# Patient Record
Sex: MALE | Race: WHITE | NOT HISPANIC OR LATINO | URBAN - METROPOLITAN AREA
[De-identification: names, ages, dates, MRNs, and addresses within clinical notes are randomized per-mention and may not be internally consistent; named-entity substitution may affect disease eponyms.]

---

## 2020-01-01 ENCOUNTER — INPATIENT (INPATIENT)
Facility: HOSPITAL | Age: 75
LOS: 7 days | DRG: 871 | End: 2020-12-10
Attending: INTERNAL MEDICINE | Admitting: HOSPITALIST
Payer: MEDICARE

## 2020-01-01 VITALS
WEIGHT: 199.96 LBS | HEIGHT: 67 IN | TEMPERATURE: 100 F | OXYGEN SATURATION: 86 % | RESPIRATION RATE: 28 BRPM | HEART RATE: 110 BPM | SYSTOLIC BLOOD PRESSURE: 103 MMHG | DIASTOLIC BLOOD PRESSURE: 61 MMHG

## 2020-01-01 DIAGNOSIS — Z98.1 ARTHRODESIS STATUS: Chronic | ICD-10-CM

## 2020-01-01 DIAGNOSIS — Z98.890 OTHER SPECIFIED POSTPROCEDURAL STATES: Chronic | ICD-10-CM

## 2020-01-01 DIAGNOSIS — J96.90 RESPIRATORY FAILURE, UNSPECIFIED, UNSPECIFIED WHETHER WITH HYPOXIA OR HYPERCAPNIA: ICD-10-CM

## 2020-01-01 LAB
A1C WITH ESTIMATED AVERAGE GLUCOSE RESULT: 6.3 % — HIGH (ref 4–5.6)
ABO RH CONFIRMATION: SIGNIFICANT CHANGE UP
ALBUMIN SERPL ELPH-MCNC: 2.1 G/DL — LOW (ref 3.3–5.2)
ALBUMIN SERPL ELPH-MCNC: 2.6 G/DL — LOW (ref 3.3–5.2)
ALBUMIN SERPL ELPH-MCNC: 2.7 G/DL — LOW (ref 3.3–5.2)
ALBUMIN SERPL ELPH-MCNC: 2.7 G/DL — LOW (ref 3.3–5.2)
ALBUMIN SERPL ELPH-MCNC: 2.8 G/DL — LOW (ref 3.3–5.2)
ALBUMIN SERPL ELPH-MCNC: 2.8 G/DL — LOW (ref 3.3–5.2)
ALBUMIN SERPL ELPH-MCNC: 3 G/DL — LOW (ref 3.3–5.2)
ALBUMIN SERPL ELPH-MCNC: 3.1 G/DL — LOW (ref 3.3–5.2)
ALBUMIN SERPL ELPH-MCNC: 3.2 G/DL — LOW (ref 3.3–5.2)
ALBUMIN SERPL ELPH-MCNC: 3.2 G/DL — LOW (ref 3.3–5.2)
ALP SERPL-CCNC: 112 U/L — SIGNIFICANT CHANGE UP (ref 40–120)
ALP SERPL-CCNC: 141 U/L — HIGH (ref 40–120)
ALP SERPL-CCNC: 163 U/L — HIGH (ref 40–120)
ALP SERPL-CCNC: 166 U/L — HIGH (ref 40–120)
ALP SERPL-CCNC: 175 U/L — HIGH (ref 40–120)
ALP SERPL-CCNC: 179 U/L — HIGH (ref 40–120)
ALP SERPL-CCNC: 208 U/L — HIGH (ref 40–120)
ALP SERPL-CCNC: 214 U/L — HIGH (ref 40–120)
ALP SERPL-CCNC: 237 U/L — HIGH (ref 40–120)
ALP SERPL-CCNC: 246 U/L — HIGH (ref 40–120)
ALT FLD-CCNC: 142 U/L — HIGH
ALT FLD-CCNC: 213 U/L — HIGH
ALT FLD-CCNC: 235 U/L — HIGH
ALT FLD-CCNC: 239 U/L — HIGH
ALT FLD-CCNC: 271 U/L — HIGH
ALT FLD-CCNC: 285 U/L — HIGH
ALT FLD-CCNC: 286 U/L — HIGH
ALT FLD-CCNC: 288 U/L — HIGH
ALT FLD-CCNC: 292 U/L — HIGH
ALT FLD-CCNC: 85 U/L — HIGH
ANION GAP SERPL CALC-SCNC: 10 MMOL/L — SIGNIFICANT CHANGE UP (ref 5–17)
ANION GAP SERPL CALC-SCNC: 10 MMOL/L — SIGNIFICANT CHANGE UP (ref 5–17)
ANION GAP SERPL CALC-SCNC: 14 MMOL/L — SIGNIFICANT CHANGE UP (ref 5–17)
ANION GAP SERPL CALC-SCNC: 14 MMOL/L — SIGNIFICANT CHANGE UP (ref 5–17)
ANION GAP SERPL CALC-SCNC: 15 MMOL/L — SIGNIFICANT CHANGE UP (ref 5–17)
ANION GAP SERPL CALC-SCNC: 16 MMOL/L — SIGNIFICANT CHANGE UP (ref 5–17)
ANION GAP SERPL CALC-SCNC: 16 MMOL/L — SIGNIFICANT CHANGE UP (ref 5–17)
ANION GAP SERPL CALC-SCNC: 17 MMOL/L — SIGNIFICANT CHANGE UP (ref 5–17)
ANION GAP SERPL CALC-SCNC: 17 MMOL/L — SIGNIFICANT CHANGE UP (ref 5–17)
ANION GAP SERPL CALC-SCNC: 19 MMOL/L — HIGH (ref 5–17)
ANION GAP SERPL CALC-SCNC: 20 MMOL/L — HIGH (ref 5–17)
APPEARANCE UR: ABNORMAL
APPEARANCE UR: ABNORMAL
APTT BLD: 26.4 SEC — LOW (ref 27.5–35.5)
APTT BLD: 28.9 SEC — SIGNIFICANT CHANGE UP (ref 27.5–35.5)
APTT BLD: 33.4 SEC — SIGNIFICANT CHANGE UP (ref 27.5–35.5)
APTT BLD: 55.5 SEC — HIGH (ref 27.5–35.5)
AST SERPL-CCNC: 161 U/L — HIGH
AST SERPL-CCNC: 203 U/L — HIGH
AST SERPL-CCNC: 269 U/L — HIGH
AST SERPL-CCNC: 278 U/L — HIGH
AST SERPL-CCNC: 279 U/L — HIGH
AST SERPL-CCNC: 305 U/L — HIGH
AST SERPL-CCNC: 31 U/L — SIGNIFICANT CHANGE UP
AST SERPL-CCNC: 35 U/L — SIGNIFICANT CHANGE UP
AST SERPL-CCNC: 362 U/L — HIGH
AST SERPL-CCNC: 44 U/L — HIGH
AT III ACT/NOR PPP CHRO: 101 % — SIGNIFICANT CHANGE UP (ref 85–135)
B2 GLYCOPROT1 AB SER QL: POSITIVE
B2 GLYCOPROT1 IGA SER QL: 5.6 SAU — SIGNIFICANT CHANGE UP
B2 GLYCOPROT1 IGG SER-ACNC: <5 SGU — SIGNIFICANT CHANGE UP
B2 GLYCOPROT1 IGM SER-ACNC: >150 SMU — HIGH
BACTERIA # UR AUTO: ABNORMAL
BACTERIA # UR AUTO: ABNORMAL
BASE EXCESS BLDA CALC-SCNC: 4.8 MMOL/L — HIGH (ref -3–3)
BASE EXCESS BLDA CALC-SCNC: 5.6 MMOL/L — HIGH (ref -3–3)
BASE EXCESS BLDA CALC-SCNC: 5.9 MMOL/L — HIGH (ref -3–3)
BASOPHILS # BLD AUTO: 0.03 K/UL — SIGNIFICANT CHANGE UP (ref 0–0.2)
BASOPHILS # BLD AUTO: 0.03 K/UL — SIGNIFICANT CHANGE UP (ref 0–0.2)
BASOPHILS # BLD AUTO: 0.04 K/UL — SIGNIFICANT CHANGE UP (ref 0–0.2)
BASOPHILS # BLD AUTO: 0.06 K/UL — SIGNIFICANT CHANGE UP (ref 0–0.2)
BASOPHILS # BLD AUTO: 0.06 K/UL — SIGNIFICANT CHANGE UP (ref 0–0.2)
BASOPHILS NFR BLD AUTO: 0.1 % — SIGNIFICANT CHANGE UP (ref 0–2)
BASOPHILS NFR BLD AUTO: 0.2 % — SIGNIFICANT CHANGE UP (ref 0–2)
BASOPHILS NFR BLD AUTO: 0.2 % — SIGNIFICANT CHANGE UP (ref 0–2)
BASOPHILS NFR BLD AUTO: 0.3 % — SIGNIFICANT CHANGE UP (ref 0–2)
BASOPHILS NFR BLD AUTO: 0.4 % — SIGNIFICANT CHANGE UP (ref 0–2)
BILIRUB SERPL-MCNC: 0.4 MG/DL — SIGNIFICANT CHANGE UP (ref 0.4–2)
BILIRUB SERPL-MCNC: 0.5 MG/DL — SIGNIFICANT CHANGE UP (ref 0.4–2)
BILIRUB SERPL-MCNC: 0.5 MG/DL — SIGNIFICANT CHANGE UP (ref 0.4–2)
BILIRUB SERPL-MCNC: 0.6 MG/DL — SIGNIFICANT CHANGE UP (ref 0.4–2)
BILIRUB SERPL-MCNC: 0.7 MG/DL — SIGNIFICANT CHANGE UP (ref 0.4–2)
BILIRUB SERPL-MCNC: 0.8 MG/DL — SIGNIFICANT CHANGE UP (ref 0.4–2)
BILIRUB SERPL-MCNC: 0.9 MG/DL — SIGNIFICANT CHANGE UP (ref 0.4–2)
BILIRUB SERPL-MCNC: 1.2 MG/DL — SIGNIFICANT CHANGE UP (ref 0.4–2)
BILIRUB UR-MCNC: NEGATIVE — SIGNIFICANT CHANGE UP
BILIRUB UR-MCNC: NEGATIVE — SIGNIFICANT CHANGE UP
BLD GP AB SCN SERPL QL: SIGNIFICANT CHANGE UP
BLOOD GAS COMMENTS ARTERIAL: SIGNIFICANT CHANGE UP
BUN SERPL-MCNC: 100 MG/DL — HIGH (ref 8–20)
BUN SERPL-MCNC: 155 MG/DL — HIGH (ref 8–20)
BUN SERPL-MCNC: 43 MG/DL — HIGH (ref 8–20)
BUN SERPL-MCNC: 45 MG/DL — HIGH (ref 8–20)
BUN SERPL-MCNC: 49 MG/DL — HIGH (ref 8–20)
BUN SERPL-MCNC: 49 MG/DL — HIGH (ref 8–20)
BUN SERPL-MCNC: 50 MG/DL — HIGH (ref 8–20)
BUN SERPL-MCNC: 55 MG/DL — HIGH (ref 8–20)
BUN SERPL-MCNC: 61 MG/DL — HIGH (ref 8–20)
BUN SERPL-MCNC: 78 MG/DL — HIGH (ref 8–20)
BUN SERPL-MCNC: 91 MG/DL — HIGH (ref 8–20)
CALCIUM SERPL-MCNC: 8.2 MG/DL — LOW (ref 8.6–10.2)
CALCIUM SERPL-MCNC: 8.3 MG/DL — LOW (ref 8.6–10.2)
CALCIUM SERPL-MCNC: 8.4 MG/DL — LOW (ref 8.6–10.2)
CALCIUM SERPL-MCNC: 8.7 MG/DL — SIGNIFICANT CHANGE UP (ref 8.6–10.2)
CALCIUM SERPL-MCNC: 8.8 MG/DL — SIGNIFICANT CHANGE UP (ref 8.6–10.2)
CALCIUM SERPL-MCNC: 8.9 MG/DL — SIGNIFICANT CHANGE UP (ref 8.6–10.2)
CALCIUM SERPL-MCNC: 9 MG/DL — SIGNIFICANT CHANGE UP (ref 8.6–10.2)
CALCIUM SERPL-MCNC: 9.1 MG/DL — SIGNIFICANT CHANGE UP (ref 8.6–10.2)
CALCIUM SERPL-MCNC: 9.1 MG/DL — SIGNIFICANT CHANGE UP (ref 8.6–10.2)
CARDIOLIPIN AB SER-ACNC: POSITIVE
CARDIOLIPIN IGM SER-MCNC: 13.2 GPL — HIGH (ref 0–12.5)
CARDIOLIPIN IGM SER-MCNC: 20.4 MPL — HIGH (ref 0–12.5)
CHLORIDE SERPL-SCNC: 101 MMOL/L — SIGNIFICANT CHANGE UP (ref 98–107)
CHLORIDE SERPL-SCNC: 103 MMOL/L — SIGNIFICANT CHANGE UP (ref 98–107)
CHLORIDE SERPL-SCNC: 103 MMOL/L — SIGNIFICANT CHANGE UP (ref 98–107)
CHLORIDE SERPL-SCNC: 104 MMOL/L — SIGNIFICANT CHANGE UP (ref 98–107)
CHLORIDE SERPL-SCNC: 105 MMOL/L — SIGNIFICANT CHANGE UP (ref 98–107)
CHLORIDE SERPL-SCNC: 106 MMOL/L — SIGNIFICANT CHANGE UP (ref 98–107)
CHLORIDE SERPL-SCNC: 111 MMOL/L — HIGH (ref 98–107)
CHLORIDE SERPL-SCNC: 112 MMOL/L — HIGH (ref 98–107)
CHLORIDE SERPL-SCNC: 114 MMOL/L — HIGH (ref 98–107)
CHLORIDE SERPL-SCNC: 121 MMOL/L — HIGH (ref 98–107)
CHLORIDE SERPL-SCNC: 126 MMOL/L — HIGH (ref 98–107)
CK MB CFR SERPL CALC: 1.7 NG/ML — SIGNIFICANT CHANGE UP (ref 0–6.7)
CK MB CFR SERPL CALC: 4.9 NG/ML — SIGNIFICANT CHANGE UP (ref 0–6.7)
CK SERPL-CCNC: 1534 U/L — HIGH (ref 30–200)
CK SERPL-CCNC: 2775 U/L — HIGH (ref 30–200)
CO2 SERPL-SCNC: 20 MMOL/L — LOW (ref 22–29)
CO2 SERPL-SCNC: 20 MMOL/L — LOW (ref 22–29)
CO2 SERPL-SCNC: 21 MMOL/L — LOW (ref 22–29)
CO2 SERPL-SCNC: 22 MMOL/L — SIGNIFICANT CHANGE UP (ref 22–29)
CO2 SERPL-SCNC: 23 MMOL/L — SIGNIFICANT CHANGE UP (ref 22–29)
CO2 SERPL-SCNC: 23 MMOL/L — SIGNIFICANT CHANGE UP (ref 22–29)
CO2 SERPL-SCNC: 24 MMOL/L — SIGNIFICANT CHANGE UP (ref 22–29)
CO2 SERPL-SCNC: 25 MMOL/L — SIGNIFICANT CHANGE UP (ref 22–29)
CO2 SERPL-SCNC: 27 MMOL/L — SIGNIFICANT CHANGE UP (ref 22–29)
COLOR SPEC: YELLOW — SIGNIFICANT CHANGE UP
COLOR SPEC: YELLOW — SIGNIFICANT CHANGE UP
COMMENT - URINE: SIGNIFICANT CHANGE UP
COMMENT - URINE: SIGNIFICANT CHANGE UP
CREAT SERPL-MCNC: 1.25 MG/DL — SIGNIFICANT CHANGE UP (ref 0.5–1.3)
CREAT SERPL-MCNC: 1.5 MG/DL — HIGH (ref 0.5–1.3)
CREAT SERPL-MCNC: 1.54 MG/DL — HIGH (ref 0.5–1.3)
CREAT SERPL-MCNC: 1.65 MG/DL — HIGH (ref 0.5–1.3)
CREAT SERPL-MCNC: 1.83 MG/DL — HIGH (ref 0.5–1.3)
CREAT SERPL-MCNC: 1.97 MG/DL — HIGH (ref 0.5–1.3)
CREAT SERPL-MCNC: 1.99 MG/DL — HIGH (ref 0.5–1.3)
CREAT SERPL-MCNC: 2.27 MG/DL — HIGH (ref 0.5–1.3)
CREAT SERPL-MCNC: 2.74 MG/DL — HIGH (ref 0.5–1.3)
CREAT SERPL-MCNC: 2.75 MG/DL — HIGH (ref 0.5–1.3)
CREAT SERPL-MCNC: 3.38 MG/DL — HIGH (ref 0.5–1.3)
CRP SERPL-MCNC: 3.23 MG/DL — HIGH (ref 0–0.4)
CRP SERPL-MCNC: 34.98 MG/DL — HIGH (ref 0–0.4)
CRP SERPL-MCNC: 41.26 MG/DL — HIGH (ref 0–0.4)
CRP SERPL-MCNC: 8.42 MG/DL — HIGH (ref 0–0.4)
CRP SERPL-MCNC: >42 MG/DL — HIGH (ref 0–0.4)
CULTURE RESULTS: SIGNIFICANT CHANGE UP
CULTURE RESULTS: SIGNIFICANT CHANGE UP
D DIMER BLD IA.RAPID-MCNC: 632 NG/ML DDU — HIGH
D DIMER BLD IA.RAPID-MCNC: 690 NG/ML DDU — HIGH
D DIMER BLD IA.RAPID-MCNC: 695 NG/ML DDU — HIGH
DEPRECATED CARDIOLIPIN IGA SER: 5.4 APL — SIGNIFICANT CHANGE UP (ref 0–12.5)
DIFF PNL FLD: ABNORMAL
DIFF PNL FLD: ABNORMAL
DRVVT SCREEN TO CONFIRM RATIO: ABNORMAL
EOSINOPHIL # BLD AUTO: 0 K/UL — SIGNIFICANT CHANGE UP (ref 0–0.5)
EOSINOPHIL # BLD AUTO: 0.02 K/UL — SIGNIFICANT CHANGE UP (ref 0–0.5)
EOSINOPHIL # BLD AUTO: 0.03 K/UL — SIGNIFICANT CHANGE UP (ref 0–0.5)
EOSINOPHIL # BLD AUTO: 0.07 K/UL — SIGNIFICANT CHANGE UP (ref 0–0.5)
EOSINOPHIL # BLD AUTO: 0.12 K/UL — SIGNIFICANT CHANGE UP (ref 0–0.5)
EOSINOPHIL NFR BLD AUTO: 0 % — SIGNIFICANT CHANGE UP (ref 0–6)
EOSINOPHIL NFR BLD AUTO: 0.1 % — SIGNIFICANT CHANGE UP (ref 0–6)
EOSINOPHIL NFR BLD AUTO: 0.2 % — SIGNIFICANT CHANGE UP (ref 0–6)
EOSINOPHIL NFR BLD AUTO: 0.3 % — SIGNIFICANT CHANGE UP (ref 0–6)
EOSINOPHIL NFR BLD AUTO: 0.8 % — SIGNIFICANT CHANGE UP (ref 0–6)
EPI CELLS # UR: SIGNIFICANT CHANGE UP
EPI CELLS # UR: SIGNIFICANT CHANGE UP
ESTIMATED AVERAGE GLUCOSE: 134 MG/DL — HIGH (ref 68–114)
FERRITIN SERPL-MCNC: 1135 NG/ML — HIGH (ref 30–400)
FERRITIN SERPL-MCNC: 1400 NG/ML — HIGH (ref 30–400)
FERRITIN SERPL-MCNC: 794 NG/ML — HIGH (ref 30–400)
FIBRINOGEN AG PPP IA-MCNC: 1240 MG/DL — HIGH
FIBRINOGEN PPP-MCNC: >1400 MG/DL — CRITICAL HIGH (ref 290–520)
GAS PNL BLDA: SIGNIFICANT CHANGE UP
GLUCOSE BLDC GLUCOMTR-MCNC: 129 MG/DL — HIGH (ref 70–99)
GLUCOSE BLDC GLUCOMTR-MCNC: 132 MG/DL — HIGH (ref 70–99)
GLUCOSE BLDC GLUCOMTR-MCNC: 136 MG/DL — HIGH (ref 70–99)
GLUCOSE BLDC GLUCOMTR-MCNC: 138 MG/DL — HIGH (ref 70–99)
GLUCOSE BLDC GLUCOMTR-MCNC: 148 MG/DL — HIGH (ref 70–99)
GLUCOSE BLDC GLUCOMTR-MCNC: 151 MG/DL — HIGH (ref 70–99)
GLUCOSE BLDC GLUCOMTR-MCNC: 155 MG/DL — HIGH (ref 70–99)
GLUCOSE BLDC GLUCOMTR-MCNC: 155 MG/DL — HIGH (ref 70–99)
GLUCOSE BLDC GLUCOMTR-MCNC: 156 MG/DL — HIGH (ref 70–99)
GLUCOSE BLDC GLUCOMTR-MCNC: 160 MG/DL — HIGH (ref 70–99)
GLUCOSE BLDC GLUCOMTR-MCNC: 168 MG/DL — HIGH (ref 70–99)
GLUCOSE BLDC GLUCOMTR-MCNC: 171 MG/DL — HIGH (ref 70–99)
GLUCOSE BLDC GLUCOMTR-MCNC: 173 MG/DL — HIGH (ref 70–99)
GLUCOSE BLDC GLUCOMTR-MCNC: 173 MG/DL — HIGH (ref 70–99)
GLUCOSE BLDC GLUCOMTR-MCNC: 174 MG/DL — HIGH (ref 70–99)
GLUCOSE BLDC GLUCOMTR-MCNC: 181 MG/DL — HIGH (ref 70–99)
GLUCOSE BLDC GLUCOMTR-MCNC: 182 MG/DL — HIGH (ref 70–99)
GLUCOSE BLDC GLUCOMTR-MCNC: 182 MG/DL — HIGH (ref 70–99)
GLUCOSE BLDC GLUCOMTR-MCNC: 183 MG/DL — HIGH (ref 70–99)
GLUCOSE BLDC GLUCOMTR-MCNC: 184 MG/DL — HIGH (ref 70–99)
GLUCOSE BLDC GLUCOMTR-MCNC: 186 MG/DL — HIGH (ref 70–99)
GLUCOSE BLDC GLUCOMTR-MCNC: 186 MG/DL — HIGH (ref 70–99)
GLUCOSE BLDC GLUCOMTR-MCNC: 187 MG/DL — HIGH (ref 70–99)
GLUCOSE BLDC GLUCOMTR-MCNC: 189 MG/DL — HIGH (ref 70–99)
GLUCOSE BLDC GLUCOMTR-MCNC: 189 MG/DL — HIGH (ref 70–99)
GLUCOSE BLDC GLUCOMTR-MCNC: 231 MG/DL — HIGH (ref 70–99)
GLUCOSE BLDC GLUCOMTR-MCNC: 233 MG/DL — HIGH (ref 70–99)
GLUCOSE BLDC GLUCOMTR-MCNC: 292 MG/DL — HIGH (ref 70–99)
GLUCOSE SERPL-MCNC: 143 MG/DL — HIGH (ref 70–99)
GLUCOSE SERPL-MCNC: 146 MG/DL — HIGH (ref 70–99)
GLUCOSE SERPL-MCNC: 166 MG/DL — HIGH (ref 70–99)
GLUCOSE SERPL-MCNC: 178 MG/DL — HIGH (ref 70–99)
GLUCOSE SERPL-MCNC: 189 MG/DL — HIGH (ref 70–99)
GLUCOSE SERPL-MCNC: 191 MG/DL — HIGH (ref 70–99)
GLUCOSE SERPL-MCNC: 192 MG/DL — HIGH (ref 70–99)
GLUCOSE SERPL-MCNC: 199 MG/DL — HIGH (ref 70–99)
GLUCOSE SERPL-MCNC: 203 MG/DL — HIGH (ref 70–99)
GLUCOSE SERPL-MCNC: 203 MG/DL — HIGH (ref 70–99)
GLUCOSE SERPL-MCNC: 227 MG/DL — HIGH (ref 70–99)
GLUCOSE UR QL: NEGATIVE MG/DL — SIGNIFICANT CHANGE UP
GLUCOSE UR QL: NEGATIVE MG/DL — SIGNIFICANT CHANGE UP
HCO3 BLDA-SCNC: 28 MMOL/L — HIGH (ref 20–26)
HCO3 BLDA-SCNC: 29 MMOL/L — HIGH (ref 20–26)
HCO3 BLDA-SCNC: 30 MMOL/L — HIGH (ref 20–26)
HCT VFR BLD CALC: 39.4 % — SIGNIFICANT CHANGE UP (ref 39–50)
HCT VFR BLD CALC: 40.2 % — SIGNIFICANT CHANGE UP (ref 39–50)
HCT VFR BLD CALC: 40.4 % — SIGNIFICANT CHANGE UP (ref 39–50)
HCT VFR BLD CALC: 40.7 % — SIGNIFICANT CHANGE UP (ref 39–50)
HCT VFR BLD CALC: 41.6 % — SIGNIFICANT CHANGE UP (ref 39–50)
HCT VFR BLD CALC: 42.7 % — SIGNIFICANT CHANGE UP (ref 39–50)
HCT VFR BLD CALC: 45.1 % — SIGNIFICANT CHANGE UP (ref 39–50)
HCT VFR BLD CALC: 45.5 % — SIGNIFICANT CHANGE UP (ref 39–50)
HCT VFR BLD CALC: 45.9 % — SIGNIFICANT CHANGE UP (ref 39–50)
HCT VFR BLD CALC: 46.8 % — SIGNIFICANT CHANGE UP (ref 39–50)
HCT VFR BLD CALC: 48.3 % — SIGNIFICANT CHANGE UP (ref 39–50)
HCV AB S/CO SERPL IA: 0.06 S/CO — SIGNIFICANT CHANGE UP (ref 0–0.99)
HCV AB SERPL-IMP: SIGNIFICANT CHANGE UP
HGB BLD-MCNC: 13.3 G/DL — SIGNIFICANT CHANGE UP (ref 13–17)
HGB BLD-MCNC: 13.5 G/DL — SIGNIFICANT CHANGE UP (ref 13–17)
HGB BLD-MCNC: 13.8 G/DL — SIGNIFICANT CHANGE UP (ref 13–17)
HGB BLD-MCNC: 13.8 G/DL — SIGNIFICANT CHANGE UP (ref 13–17)
HGB BLD-MCNC: 14.1 G/DL — SIGNIFICANT CHANGE UP (ref 13–17)
HGB BLD-MCNC: 14.3 G/DL — SIGNIFICANT CHANGE UP (ref 13–17)
HGB BLD-MCNC: 14.4 G/DL — SIGNIFICANT CHANGE UP (ref 13–17)
HGB BLD-MCNC: 14.8 G/DL — SIGNIFICANT CHANGE UP (ref 13–17)
HGB BLD-MCNC: 14.9 G/DL — SIGNIFICANT CHANGE UP (ref 13–17)
HGB BLD-MCNC: 14.9 G/DL — SIGNIFICANT CHANGE UP (ref 13–17)
HGB BLD-MCNC: 15.7 G/DL — SIGNIFICANT CHANGE UP (ref 13–17)
HOROWITZ INDEX BLDA+IHG-RTO: 0.75 — SIGNIFICANT CHANGE UP
HOROWITZ INDEX BLDA+IHG-RTO: 75 — SIGNIFICANT CHANGE UP
HOROWITZ INDEX BLDA+IHG-RTO: SIGNIFICANT CHANGE UP
IMM GRANULOCYTES NFR BLD AUTO: 0.4 % — SIGNIFICANT CHANGE UP (ref 0–1.5)
IMM GRANULOCYTES NFR BLD AUTO: 0.4 % — SIGNIFICANT CHANGE UP (ref 0–1.5)
IMM GRANULOCYTES NFR BLD AUTO: 0.9 % — SIGNIFICANT CHANGE UP (ref 0–1.5)
IMM GRANULOCYTES NFR BLD AUTO: 0.9 % — SIGNIFICANT CHANGE UP (ref 0–1.5)
IMM GRANULOCYTES NFR BLD AUTO: 1.1 % — SIGNIFICANT CHANGE UP (ref 0–1.5)
INR BLD: 1.31 RATIO — HIGH (ref 0.88–1.16)
INR BLD: 1.37 RATIO — HIGH (ref 0.88–1.16)
INR BLD: 1.6 RATIO — HIGH (ref 0.88–1.16)
INR BLD: 1.78 RATIO — HIGH (ref 0.88–1.16)
KETONES UR-MCNC: NEGATIVE — SIGNIFICANT CHANGE UP
KETONES UR-MCNC: NEGATIVE — SIGNIFICANT CHANGE UP
LA NT DPL PPP QL: 74.7 SEC — SIGNIFICANT CHANGE UP
LACTATE SERPL-SCNC: 1.7 MMOL/L — SIGNIFICANT CHANGE UP (ref 0.5–2)
LACTATE SERPL-SCNC: 2 MMOL/L — SIGNIFICANT CHANGE UP (ref 0.5–2)
LACTATE SERPL-SCNC: 2.1 MMOL/L — HIGH (ref 0.5–2)
LACTATE SERPL-SCNC: 4.9 MMOL/L — CRITICAL HIGH (ref 0.5–2)
LDH SERPL L TO P-CCNC: 625 U/L — HIGH (ref 98–192)
LEUKOCYTE ESTERASE UR-ACNC: ABNORMAL
LEUKOCYTE ESTERASE UR-ACNC: NEGATIVE — SIGNIFICANT CHANGE UP
LYMPHOCYTES # BLD AUTO: 0.54 K/UL — LOW (ref 1–3.3)
LYMPHOCYTES # BLD AUTO: 0.8 K/UL — LOW (ref 1–3.3)
LYMPHOCYTES # BLD AUTO: 0.87 K/UL — LOW (ref 1–3.3)
LYMPHOCYTES # BLD AUTO: 0.89 K/UL — LOW (ref 1–3.3)
LYMPHOCYTES # BLD AUTO: 1.18 K/UL — SIGNIFICANT CHANGE UP (ref 1–3.3)
LYMPHOCYTES # BLD AUTO: 2.6 % — LOW (ref 13–44)
LYMPHOCYTES # BLD AUTO: 3.7 % — LOW (ref 13–44)
LYMPHOCYTES # BLD AUTO: 4 % — LOW (ref 13–44)
LYMPHOCYTES # BLD AUTO: 5.3 % — LOW (ref 13–44)
LYMPHOCYTES # BLD AUTO: 8.2 % — LOW (ref 13–44)
MAGNESIUM SERPL-MCNC: 2.6 MG/DL — SIGNIFICANT CHANGE UP (ref 1.6–2.6)
MAGNESIUM SERPL-MCNC: 2.8 MG/DL — HIGH (ref 1.6–2.6)
MAGNESIUM SERPL-MCNC: 2.8 MG/DL — HIGH (ref 1.6–2.6)
MAGNESIUM SERPL-MCNC: 2.9 MG/DL — HIGH (ref 1.6–2.6)
MAGNESIUM SERPL-MCNC: 3.4 MG/DL — HIGH (ref 1.6–2.6)
MAGNESIUM SERPL-MCNC: 3.4 MG/DL — HIGH (ref 1.6–2.6)
MAGNESIUM SERPL-MCNC: 3.6 MG/DL — HIGH (ref 1.6–2.6)
MCHC RBC-ENTMCNC: 27.8 PG — SIGNIFICANT CHANGE UP (ref 27–34)
MCHC RBC-ENTMCNC: 28.2 PG — SIGNIFICANT CHANGE UP (ref 27–34)
MCHC RBC-ENTMCNC: 28.2 PG — SIGNIFICANT CHANGE UP (ref 27–34)
MCHC RBC-ENTMCNC: 28.3 PG — SIGNIFICANT CHANGE UP (ref 27–34)
MCHC RBC-ENTMCNC: 28.3 PG — SIGNIFICANT CHANGE UP (ref 27–34)
MCHC RBC-ENTMCNC: 28.4 PG — SIGNIFICANT CHANGE UP (ref 27–34)
MCHC RBC-ENTMCNC: 28.5 PG — SIGNIFICANT CHANGE UP (ref 27–34)
MCHC RBC-ENTMCNC: 28.6 PG — SIGNIFICANT CHANGE UP (ref 27–34)
MCHC RBC-ENTMCNC: 28.9 PG — SIGNIFICANT CHANGE UP (ref 27–34)
MCHC RBC-ENTMCNC: 30.8 GM/DL — LOW (ref 32–36)
MCHC RBC-ENTMCNC: 32.5 GM/DL — SIGNIFICANT CHANGE UP (ref 32–36)
MCHC RBC-ENTMCNC: 32.5 GM/DL — SIGNIFICANT CHANGE UP (ref 32–36)
MCHC RBC-ENTMCNC: 32.7 GM/DL — SIGNIFICANT CHANGE UP (ref 32–36)
MCHC RBC-ENTMCNC: 32.8 GM/DL — SIGNIFICANT CHANGE UP (ref 32–36)
MCHC RBC-ENTMCNC: 33.2 GM/DL — SIGNIFICANT CHANGE UP (ref 32–36)
MCHC RBC-ENTMCNC: 33.5 GM/DL — SIGNIFICANT CHANGE UP (ref 32–36)
MCHC RBC-ENTMCNC: 33.8 GM/DL — SIGNIFICANT CHANGE UP (ref 32–36)
MCHC RBC-ENTMCNC: 33.9 GM/DL — SIGNIFICANT CHANGE UP (ref 32–36)
MCHC RBC-ENTMCNC: 34.2 GM/DL — SIGNIFICANT CHANGE UP (ref 32–36)
MCHC RBC-ENTMCNC: 34.3 GM/DL — SIGNIFICANT CHANGE UP (ref 32–36)
MCV RBC AUTO: 82.7 FL — SIGNIFICANT CHANGE UP (ref 80–100)
MCV RBC AUTO: 83.6 FL — SIGNIFICANT CHANGE UP (ref 80–100)
MCV RBC AUTO: 84 FL — SIGNIFICANT CHANGE UP (ref 80–100)
MCV RBC AUTO: 84.7 FL — SIGNIFICANT CHANGE UP (ref 80–100)
MCV RBC AUTO: 85.1 FL — SIGNIFICANT CHANGE UP (ref 80–100)
MCV RBC AUTO: 85.5 FL — SIGNIFICANT CHANGE UP (ref 80–100)
MCV RBC AUTO: 86.2 FL — SIGNIFICANT CHANGE UP (ref 80–100)
MCV RBC AUTO: 86.7 FL — SIGNIFICANT CHANGE UP (ref 80–100)
MCV RBC AUTO: 86.9 FL — SIGNIFICANT CHANGE UP (ref 80–100)
MCV RBC AUTO: 87 FL — SIGNIFICANT CHANGE UP (ref 80–100)
MCV RBC AUTO: 90.3 FL — SIGNIFICANT CHANGE UP (ref 80–100)
MONOCYTES # BLD AUTO: 0.42 K/UL — SIGNIFICANT CHANGE UP (ref 0–0.9)
MONOCYTES # BLD AUTO: 0.53 K/UL — SIGNIFICANT CHANGE UP (ref 0–0.9)
MONOCYTES # BLD AUTO: 0.7 K/UL — SIGNIFICANT CHANGE UP (ref 0–0.9)
MONOCYTES # BLD AUTO: 0.8 K/UL — SIGNIFICANT CHANGE UP (ref 0–0.9)
MONOCYTES # BLD AUTO: 0.87 K/UL — SIGNIFICANT CHANGE UP (ref 0–0.9)
MONOCYTES NFR BLD AUTO: 2.6 % — SIGNIFICANT CHANGE UP (ref 2–14)
MONOCYTES NFR BLD AUTO: 2.8 % — SIGNIFICANT CHANGE UP (ref 2–14)
MONOCYTES NFR BLD AUTO: 3.2 % — SIGNIFICANT CHANGE UP (ref 2–14)
MONOCYTES NFR BLD AUTO: 3.6 % — SIGNIFICANT CHANGE UP (ref 2–14)
MONOCYTES NFR BLD AUTO: 5.6 % — SIGNIFICANT CHANGE UP (ref 2–14)
NEUTROPHILS # BLD AUTO: 12.12 K/UL — HIGH (ref 1.8–7.4)
NEUTROPHILS # BLD AUTO: 13.64 K/UL — HIGH (ref 1.8–7.4)
NEUTROPHILS # BLD AUTO: 19.05 K/UL — HIGH (ref 1.8–7.4)
NEUTROPHILS # BLD AUTO: 19.73 K/UL — HIGH (ref 1.8–7.4)
NEUTROPHILS # BLD AUTO: 21.88 K/UL — HIGH (ref 1.8–7.4)
NEUTROPHILS NFR BLD AUTO: 84.6 % — HIGH (ref 43–77)
NEUTROPHILS NFR BLD AUTO: 91.1 % — HIGH (ref 43–77)
NEUTROPHILS NFR BLD AUTO: 91.4 % — HIGH (ref 43–77)
NEUTROPHILS NFR BLD AUTO: 91.5 % — HIGH (ref 43–77)
NEUTROPHILS NFR BLD AUTO: 93.5 % — HIGH (ref 43–77)
NITRITE UR-MCNC: NEGATIVE — SIGNIFICANT CHANGE UP
NITRITE UR-MCNC: NEGATIVE — SIGNIFICANT CHANGE UP
NORMALIZED SCT PPP-RTO: 0.81 RATIO — SIGNIFICANT CHANGE UP (ref 0–1.16)
NORMALIZED SCT PPP-RTO: SIGNIFICANT CHANGE UP
OSMOLALITY UR: 623 MOSM/KG — SIGNIFICANT CHANGE UP (ref 300–1000)
PCO2 BLDA: 35 MMHG — SIGNIFICANT CHANGE UP (ref 35–45)
PCO2 BLDA: 38 MMHG — SIGNIFICANT CHANGE UP (ref 35–45)
PCO2 BLDA: 40 MMHG — SIGNIFICANT CHANGE UP (ref 35–45)
PH BLDA: 7.48 — HIGH (ref 7.35–7.45)
PH BLDA: 7.49 — HIGH (ref 7.35–7.45)
PH BLDA: 7.51 — HIGH (ref 7.35–7.45)
PH UR: 5 — SIGNIFICANT CHANGE UP (ref 5–8)
PH UR: 5 — SIGNIFICANT CHANGE UP (ref 5–8)
PHOSPHATE SERPL-MCNC: 3.4 MG/DL — SIGNIFICANT CHANGE UP (ref 2.4–4.7)
PHOSPHATE SERPL-MCNC: 3.4 MG/DL — SIGNIFICANT CHANGE UP (ref 2.4–4.7)
PHOSPHATE SERPL-MCNC: 3.7 MG/DL — SIGNIFICANT CHANGE UP (ref 2.4–4.7)
PHOSPHATE SERPL-MCNC: 3.9 MG/DL — SIGNIFICANT CHANGE UP (ref 2.4–4.7)
PHOSPHATE SERPL-MCNC: 5.1 MG/DL — HIGH (ref 2.4–4.7)
PHOSPHATE SERPL-MCNC: 6 MG/DL — HIGH (ref 2.4–4.7)
PHOSPHATE SERPL-MCNC: 7.1 MG/DL — HIGH (ref 2.4–4.7)
PLATELET # BLD AUTO: 277 K/UL — SIGNIFICANT CHANGE UP (ref 150–400)
PLATELET # BLD AUTO: 289 K/UL — SIGNIFICANT CHANGE UP (ref 150–400)
PLATELET # BLD AUTO: 296 K/UL — SIGNIFICANT CHANGE UP (ref 150–400)
PLATELET # BLD AUTO: 350 K/UL — SIGNIFICANT CHANGE UP (ref 150–400)
PLATELET # BLD AUTO: 352 K/UL — SIGNIFICANT CHANGE UP (ref 150–400)
PLATELET # BLD AUTO: 357 K/UL — SIGNIFICANT CHANGE UP (ref 150–400)
PLATELET # BLD AUTO: 376 K/UL — SIGNIFICANT CHANGE UP (ref 150–400)
PLATELET # BLD AUTO: 409 K/UL — HIGH (ref 150–400)
PLATELET # BLD AUTO: 418 K/UL — HIGH (ref 150–400)
PLATELET # BLD AUTO: 425 K/UL — HIGH (ref 150–400)
PLATELET # BLD AUTO: 450 K/UL — HIGH (ref 150–400)
PO2 BLDA: 57 MMHG — LOW (ref 83–108)
PO2 BLDA: 75 MMHG — LOW (ref 83–108)
PO2 BLDA: 82 MMHG — LOW (ref 83–108)
POTASSIUM SERPL-MCNC: 3.6 MMOL/L — SIGNIFICANT CHANGE UP (ref 3.5–5.3)
POTASSIUM SERPL-MCNC: 3.8 MMOL/L — SIGNIFICANT CHANGE UP (ref 3.5–5.3)
POTASSIUM SERPL-MCNC: 4 MMOL/L — SIGNIFICANT CHANGE UP (ref 3.5–5.3)
POTASSIUM SERPL-MCNC: 4.1 MMOL/L — SIGNIFICANT CHANGE UP (ref 3.5–5.3)
POTASSIUM SERPL-MCNC: 4.2 MMOL/L — SIGNIFICANT CHANGE UP (ref 3.5–5.3)
POTASSIUM SERPL-MCNC: 4.3 MMOL/L — SIGNIFICANT CHANGE UP (ref 3.5–5.3)
POTASSIUM SERPL-MCNC: 4.6 MMOL/L — SIGNIFICANT CHANGE UP (ref 3.5–5.3)
POTASSIUM SERPL-MCNC: 5.7 MMOL/L — HIGH (ref 3.5–5.3)
POTASSIUM SERPL-SCNC: 3.6 MMOL/L — SIGNIFICANT CHANGE UP (ref 3.5–5.3)
POTASSIUM SERPL-SCNC: 3.8 MMOL/L — SIGNIFICANT CHANGE UP (ref 3.5–5.3)
POTASSIUM SERPL-SCNC: 4 MMOL/L — SIGNIFICANT CHANGE UP (ref 3.5–5.3)
POTASSIUM SERPL-SCNC: 4.1 MMOL/L — SIGNIFICANT CHANGE UP (ref 3.5–5.3)
POTASSIUM SERPL-SCNC: 4.2 MMOL/L — SIGNIFICANT CHANGE UP (ref 3.5–5.3)
POTASSIUM SERPL-SCNC: 4.3 MMOL/L — SIGNIFICANT CHANGE UP (ref 3.5–5.3)
POTASSIUM SERPL-SCNC: 4.6 MMOL/L — SIGNIFICANT CHANGE UP (ref 3.5–5.3)
POTASSIUM SERPL-SCNC: 5.7 MMOL/L — HIGH (ref 3.5–5.3)
PROCALCITONIN SERPL-MCNC: 0.3 NG/ML — HIGH (ref 0.02–0.1)
PROCALCITONIN SERPL-MCNC: 0.32 NG/ML — HIGH (ref 0.02–0.1)
PROCALCITONIN SERPL-MCNC: 0.78 NG/ML — HIGH (ref 0.02–0.1)
PROCALCITONIN SERPL-MCNC: 1 NG/ML — HIGH (ref 0.02–0.1)
PROCALCITONIN SERPL-MCNC: 1.3 NG/ML — HIGH (ref 0.02–0.1)
PROCALCITONIN SERPL-MCNC: 1.61 NG/ML — HIGH (ref 0.02–0.1)
PROT C ACT/NOR PPP: 104 % — SIGNIFICANT CHANGE UP (ref 74–150)
PROT C ACT/NOR PPP: 118 % — SIGNIFICANT CHANGE UP (ref 74–150)
PROT C ACT/NOR PPP: 98 % — SIGNIFICANT CHANGE UP (ref 74–150)
PROT S FREE AG PPP IA-ACNC: 55 % — LOW (ref 67–141)
PROT S FREE PPP-ACNC: 51 % — LOW (ref 63–140)
PROT S FREE PPP-ACNC: 73 % — SIGNIFICANT CHANGE UP (ref 63–140)
PROT SERPL-MCNC: 6.2 G/DL — LOW (ref 6.6–8.7)
PROT SERPL-MCNC: 6.5 G/DL — LOW (ref 6.6–8.7)
PROT SERPL-MCNC: 7 G/DL — SIGNIFICANT CHANGE UP (ref 6.6–8.7)
PROT SERPL-MCNC: 7 G/DL — SIGNIFICANT CHANGE UP (ref 6.6–8.7)
PROT SERPL-MCNC: 7.3 G/DL — SIGNIFICANT CHANGE UP (ref 6.6–8.7)
PROT SERPL-MCNC: 7.4 G/DL — SIGNIFICANT CHANGE UP (ref 6.6–8.7)
PROT SERPL-MCNC: 7.6 G/DL — SIGNIFICANT CHANGE UP (ref 6.6–8.7)
PROT SERPL-MCNC: 7.9 G/DL — SIGNIFICANT CHANGE UP (ref 6.6–8.7)
PROT UR-MCNC: 100 MG/DL
PROT UR-MCNC: 100 MG/DL
PROTHROM AB SERPL-ACNC: 15 SEC — HIGH (ref 10.6–13.6)
PROTHROM AB SERPL-ACNC: 15.7 SEC — HIGH (ref 10.6–13.6)
PROTHROM AB SERPL-ACNC: 18.1 SEC — HIGH (ref 10.6–13.6)
PROTHROM AB SERPL-ACNC: 20.1 SEC — HIGH (ref 10.6–13.6)
RAPID RVP RESULT: SIGNIFICANT CHANGE UP
RBC # BLD: 4.61 M/UL — SIGNIFICANT CHANGE UP (ref 4.2–5.8)
RBC # BLD: 4.78 M/UL — SIGNIFICANT CHANGE UP (ref 4.2–5.8)
RBC # BLD: 4.83 M/UL — SIGNIFICANT CHANGE UP (ref 4.2–5.8)
RBC # BLD: 4.86 M/UL — SIGNIFICANT CHANGE UP (ref 4.2–5.8)
RBC # BLD: 4.95 M/UL — SIGNIFICANT CHANGE UP (ref 4.2–5.8)
RBC # BLD: 5.04 M/UL — SIGNIFICANT CHANGE UP (ref 4.2–5.8)
RBC # BLD: 5.18 M/UL — SIGNIFICANT CHANGE UP (ref 4.2–5.8)
RBC # BLD: 5.23 M/UL — SIGNIFICANT CHANGE UP (ref 4.2–5.8)
RBC # BLD: 5.25 M/UL — SIGNIFICANT CHANGE UP (ref 4.2–5.8)
RBC # BLD: 5.28 M/UL — SIGNIFICANT CHANGE UP (ref 4.2–5.8)
RBC # BLD: 5.55 M/UL — SIGNIFICANT CHANGE UP (ref 4.2–5.8)
RBC # FLD: 14.5 % — SIGNIFICANT CHANGE UP (ref 10.3–14.5)
RBC # FLD: 14.6 % — HIGH (ref 10.3–14.5)
RBC # FLD: 15.1 % — HIGH (ref 10.3–14.5)
RBC # FLD: 15.5 % — HIGH (ref 10.3–14.5)
RBC # FLD: 15.7 % — HIGH (ref 10.3–14.5)
RBC # FLD: 15.7 % — HIGH (ref 10.3–14.5)
RBC # FLD: 15.8 % — HIGH (ref 10.3–14.5)
RBC # FLD: 15.9 % — HIGH (ref 10.3–14.5)
RBC CASTS # UR COMP ASSIST: ABNORMAL /HPF (ref 0–4)
RBC CASTS # UR COMP ASSIST: ABNORMAL /HPF (ref 0–4)
SAO2 % BLDA: 91 % — LOW (ref 95–99)
SAO2 % BLDA: 95 % — SIGNIFICANT CHANGE UP (ref 95–99)
SAO2 % BLDA: 96 % — SIGNIFICANT CHANGE UP (ref 95–99)
SARS-COV-2 IGG SERPL QL IA: NEGATIVE — SIGNIFICANT CHANGE UP
SARS-COV-2 IGM SERPL IA-ACNC: 0.66 INDEX — SIGNIFICANT CHANGE UP
SARS-COV-2 RNA SPEC QL NAA+PROBE: DETECTED
SODIUM SERPL-SCNC: 139 MMOL/L — SIGNIFICANT CHANGE UP (ref 135–145)
SODIUM SERPL-SCNC: 141 MMOL/L — SIGNIFICANT CHANGE UP (ref 135–145)
SODIUM SERPL-SCNC: 143 MMOL/L — SIGNIFICANT CHANGE UP (ref 135–145)
SODIUM SERPL-SCNC: 143 MMOL/L — SIGNIFICANT CHANGE UP (ref 135–145)
SODIUM SERPL-SCNC: 144 MMOL/L — SIGNIFICANT CHANGE UP (ref 135–145)
SODIUM SERPL-SCNC: 147 MMOL/L — HIGH (ref 135–145)
SODIUM SERPL-SCNC: 148 MMOL/L — HIGH (ref 135–145)
SODIUM SERPL-SCNC: 154 MMOL/L — HIGH (ref 135–145)
SODIUM SERPL-SCNC: 154 MMOL/L — HIGH (ref 135–145)
SODIUM SERPL-SCNC: 156 MMOL/L — HIGH (ref 135–145)
SODIUM SERPL-SCNC: 157 MMOL/L — HIGH (ref 135–145)
SODIUM UR-SCNC: 80 MMOL/L — SIGNIFICANT CHANGE UP
SP GR SPEC: 1.01 — SIGNIFICANT CHANGE UP (ref 1.01–1.02)
SP GR SPEC: 1.01 — SIGNIFICANT CHANGE UP (ref 1.01–1.02)
SPECIMEN SOURCE: SIGNIFICANT CHANGE UP
SPECIMEN SOURCE: SIGNIFICANT CHANGE UP
TROPONIN T SERPL-MCNC: 0.01 NG/ML — SIGNIFICANT CHANGE UP (ref 0–0.06)
TROPONIN T SERPL-MCNC: 0.09 NG/ML — HIGH (ref 0–0.06)
TROPONIN T SERPL-MCNC: 0.13 NG/ML — HIGH (ref 0–0.06)
URATE CRY FLD QL MICRO: ABNORMAL
UROBILINOGEN FLD QL: 4 MG/DL
UROBILINOGEN FLD QL: NEGATIVE MG/DL — SIGNIFICANT CHANGE UP
VANCOMYCIN TROUGH SERPL-MCNC: 11.2 UG/ML — SIGNIFICANT CHANGE UP (ref 10–20)
VANCOMYCIN TROUGH SERPL-MCNC: 22.8 UG/ML — HIGH (ref 10–20)
VANCOMYCIN TROUGH SERPL-MCNC: 9.8 UG/ML — LOW (ref 10–20)
WBC # BLD: 14.34 K/UL — HIGH (ref 3.8–10.5)
WBC # BLD: 14.98 K/UL — HIGH (ref 3.8–10.5)
WBC # BLD: 20.4 K/UL — HIGH (ref 3.8–10.5)
WBC # BLD: 21.13 K/UL — HIGH (ref 3.8–10.5)
WBC # BLD: 21.59 K/UL — HIGH (ref 3.8–10.5)
WBC # BLD: 23.91 K/UL — HIGH (ref 3.8–10.5)
WBC # BLD: 24.26 K/UL — HIGH (ref 3.8–10.5)
WBC # BLD: 26.42 K/UL — HIGH (ref 3.8–10.5)
WBC # BLD: 27.59 K/UL — HIGH (ref 3.8–10.5)
WBC # BLD: 29.58 K/UL — HIGH (ref 3.8–10.5)
WBC # BLD: 29.95 K/UL — HIGH (ref 3.8–10.5)
WBC # FLD AUTO: 14.34 K/UL — HIGH (ref 3.8–10.5)
WBC # FLD AUTO: 14.98 K/UL — HIGH (ref 3.8–10.5)
WBC # FLD AUTO: 20.4 K/UL — HIGH (ref 3.8–10.5)
WBC # FLD AUTO: 21.13 K/UL — HIGH (ref 3.8–10.5)
WBC # FLD AUTO: 21.59 K/UL — HIGH (ref 3.8–10.5)
WBC # FLD AUTO: 23.91 K/UL — HIGH (ref 3.8–10.5)
WBC # FLD AUTO: 24.26 K/UL — HIGH (ref 3.8–10.5)
WBC # FLD AUTO: 26.42 K/UL — HIGH (ref 3.8–10.5)
WBC # FLD AUTO: 27.59 K/UL — HIGH (ref 3.8–10.5)
WBC # FLD AUTO: 29.58 K/UL — HIGH (ref 3.8–10.5)
WBC # FLD AUTO: 29.95 K/UL — HIGH (ref 3.8–10.5)
WBC UR QL: SIGNIFICANT CHANGE UP
WBC UR QL: SIGNIFICANT CHANGE UP

## 2020-01-01 PROCEDURE — 99233 SBSQ HOSP IP/OBS HIGH 50: CPT

## 2020-01-01 PROCEDURE — 99233 SBSQ HOSP IP/OBS HIGH 50: CPT | Mod: CS,GC

## 2020-01-01 PROCEDURE — 99223 1ST HOSP IP/OBS HIGH 75: CPT | Mod: CS

## 2020-01-01 PROCEDURE — 99291 CRITICAL CARE FIRST HOUR: CPT | Mod: CS

## 2020-01-01 PROCEDURE — 76770 US EXAM ABDO BACK WALL COMP: CPT | Mod: 26

## 2020-01-01 PROCEDURE — 93970 EXTREMITY STUDY: CPT | Mod: 26

## 2020-01-01 PROCEDURE — 99233 SBSQ HOSP IP/OBS HIGH 50: CPT | Mod: CS

## 2020-01-01 PROCEDURE — 99497 ADVNCD CARE PLAN 30 MIN: CPT | Mod: CS,25

## 2020-01-01 PROCEDURE — 93010 ELECTROCARDIOGRAM REPORT: CPT

## 2020-01-01 PROCEDURE — 71045 X-RAY EXAM CHEST 1 VIEW: CPT | Mod: 26

## 2020-01-01 PROCEDURE — 99497 ADVNCD CARE PLAN 30 MIN: CPT | Mod: CS

## 2020-01-01 PROCEDURE — 73630 X-RAY EXAM OF FOOT: CPT | Mod: 26,RT

## 2020-01-01 PROCEDURE — 99232 SBSQ HOSP IP/OBS MODERATE 35: CPT | Mod: CS

## 2020-01-01 PROCEDURE — 99291 CRITICAL CARE FIRST HOUR: CPT

## 2020-01-01 PROCEDURE — 99222 1ST HOSP IP/OBS MODERATE 55: CPT | Mod: CS

## 2020-01-01 PROCEDURE — 99292 CRITICAL CARE ADDL 30 MIN: CPT | Mod: CS

## 2020-01-01 RX ORDER — ACETAMINOPHEN 500 MG
650 TABLET ORAL EVERY 4 HOURS
Refills: 0 | Status: DISCONTINUED | OUTPATIENT
Start: 2020-01-01 | End: 2020-01-01

## 2020-01-01 RX ORDER — VANCOMYCIN HCL 1 G
1250 VIAL (EA) INTRAVENOUS
Refills: 0 | Status: ACTIVE | OUTPATIENT
Start: 2020-01-01 | End: 2021-11-07

## 2020-01-01 RX ORDER — FUROSEMIDE 40 MG
40 TABLET ORAL ONCE
Refills: 0 | Status: COMPLETED | OUTPATIENT
Start: 2020-01-01 | End: 2020-01-01

## 2020-01-01 RX ORDER — ACETAMINOPHEN 500 MG
650 TABLET ORAL ONCE
Refills: 0 | Status: COMPLETED | OUTPATIENT
Start: 2020-01-01 | End: 2020-01-01

## 2020-01-01 RX ORDER — ENOXAPARIN SODIUM 100 MG/ML
40 INJECTION SUBCUTANEOUS DAILY
Refills: 0 | Status: DISCONTINUED | OUTPATIENT
Start: 2020-01-01 | End: 2020-01-01

## 2020-01-01 RX ORDER — LISINOPRIL/HYDROCHLOROTHIAZIDE 10-12.5 MG
0 TABLET ORAL
Qty: 0 | Refills: 1 | DISCHARGE

## 2020-01-01 RX ORDER — ASPIRIN/CALCIUM CARB/MAGNESIUM 324 MG
325 TABLET ORAL DAILY
Refills: 0 | Status: DISCONTINUED | OUTPATIENT
Start: 2020-01-01 | End: 2020-01-01

## 2020-01-01 RX ORDER — INSULIN GLARGINE 100 [IU]/ML
10 INJECTION, SOLUTION SUBCUTANEOUS AT BEDTIME
Refills: 0 | Status: DISCONTINUED | OUTPATIENT
Start: 2020-01-01 | End: 2020-01-01

## 2020-01-01 RX ORDER — ENOXAPARIN SODIUM 100 MG/ML
109 INJECTION SUBCUTANEOUS EVERY 12 HOURS
Refills: 0 | Status: DISCONTINUED | OUTPATIENT
Start: 2020-01-01 | End: 2020-01-01

## 2020-01-01 RX ORDER — TAMSULOSIN HYDROCHLORIDE 0.4 MG/1
0 CAPSULE ORAL
Qty: 0 | Refills: 0 | DISCHARGE

## 2020-01-01 RX ORDER — MORPHINE SULFATE 50 MG/1
2 CAPSULE, EXTENDED RELEASE ORAL ONCE
Refills: 0 | Status: DISCONTINUED | OUTPATIENT
Start: 2020-01-01 | End: 2020-01-01

## 2020-01-01 RX ORDER — DEXAMETHASONE 0.5 MG/5ML
6 ELIXIR ORAL DAILY
Refills: 0 | Status: DISCONTINUED | OUTPATIENT
Start: 2020-01-01 | End: 2020-01-01

## 2020-01-01 RX ORDER — SODIUM CHLORIDE 9 MG/ML
1000 INJECTION, SOLUTION INTRAVENOUS
Refills: 0 | Status: DISCONTINUED | OUTPATIENT
Start: 2020-01-01 | End: 2020-01-01

## 2020-01-01 RX ORDER — ASPIRIN/CALCIUM CARB/MAGNESIUM 324 MG
300 TABLET ORAL DAILY
Refills: 0 | Status: ACTIVE | OUTPATIENT
Start: 2020-01-01 | End: 2021-11-05

## 2020-01-01 RX ORDER — VANCOMYCIN HCL 1 G
2000 VIAL (EA) INTRAVENOUS ONCE
Refills: 0 | Status: COMPLETED | OUTPATIENT
Start: 2020-01-01 | End: 2020-01-01

## 2020-01-01 RX ORDER — INSULIN GLARGINE 100 [IU]/ML
15 INJECTION, SOLUTION SUBCUTANEOUS
Refills: 0 | Status: ACTIVE | OUTPATIENT
Start: 2020-01-01 | End: 2021-11-05

## 2020-01-01 RX ORDER — PIPERACILLIN AND TAZOBACTAM 4; .5 G/20ML; G/20ML
3.38 INJECTION, POWDER, LYOPHILIZED, FOR SOLUTION INTRAVENOUS ONCE
Refills: 0 | Status: COMPLETED | OUTPATIENT
Start: 2020-01-01 | End: 2020-01-01

## 2020-01-01 RX ORDER — GUAIFENESIN/DEXTROMETHORPHAN 600MG-30MG
10 TABLET, EXTENDED RELEASE 12 HR ORAL EVERY 4 HOURS
Refills: 0 | Status: ACTIVE | OUTPATIENT
Start: 2020-01-01 | End: 2021-10-31

## 2020-01-01 RX ORDER — TAMSULOSIN HYDROCHLORIDE 0.4 MG/1
0.4 CAPSULE ORAL AT BEDTIME
Refills: 0 | Status: DISCONTINUED | OUTPATIENT
Start: 2020-01-01 | End: 2020-01-01

## 2020-01-01 RX ORDER — ACETAMINOPHEN 500 MG
1000 TABLET ORAL ONCE
Refills: 0 | Status: COMPLETED | OUTPATIENT
Start: 2020-01-01 | End: 2020-01-01

## 2020-01-01 RX ORDER — ENOXAPARIN SODIUM 100 MG/ML
40 INJECTION SUBCUTANEOUS EVERY 12 HOURS
Refills: 0 | Status: DISCONTINUED | OUTPATIENT
Start: 2020-01-01 | End: 2020-01-01

## 2020-01-01 RX ORDER — DEXTROSE 50 % IN WATER 50 %
15 SYRINGE (ML) INTRAVENOUS ONCE
Refills: 0 | Status: DISCONTINUED | OUTPATIENT
Start: 2020-01-01 | End: 2020-01-01

## 2020-01-01 RX ORDER — METFORMIN HYDROCHLORIDE 850 MG/1
0 TABLET ORAL
Qty: 0 | Refills: 0 | DISCHARGE

## 2020-01-01 RX ORDER — DEXTROSE 50 % IN WATER 50 %
25 SYRINGE (ML) INTRAVENOUS ONCE
Refills: 0 | Status: DISCONTINUED | OUTPATIENT
Start: 2020-01-01 | End: 2020-01-01

## 2020-01-01 RX ORDER — DEXTROSE 50 % IN WATER 50 %
12.5 SYRINGE (ML) INTRAVENOUS ONCE
Refills: 0 | Status: DISCONTINUED | OUTPATIENT
Start: 2020-01-01 | End: 2020-01-01

## 2020-01-01 RX ORDER — DEXAMETHASONE 0.5 MG/5ML
6 ELIXIR ORAL ONCE
Refills: 0 | Status: COMPLETED | OUTPATIENT
Start: 2020-01-01 | End: 2020-01-01

## 2020-01-01 RX ORDER — PANTOPRAZOLE SODIUM 20 MG/1
40 TABLET, DELAYED RELEASE ORAL
Refills: 0 | Status: DISCONTINUED | OUTPATIENT
Start: 2020-01-01 | End: 2020-01-01

## 2020-01-01 RX ORDER — BUDESONIDE AND FORMOTEROL FUMARATE DIHYDRATE 160; 4.5 UG/1; UG/1
2 AEROSOL RESPIRATORY (INHALATION)
Refills: 0 | Status: ACTIVE | OUTPATIENT
Start: 2020-01-01 | End: 2021-11-06

## 2020-01-01 RX ORDER — FAMOTIDINE 10 MG/ML
20 INJECTION INTRAVENOUS DAILY
Refills: 0 | Status: ACTIVE | OUTPATIENT
Start: 2020-01-01 | End: 2021-11-06

## 2020-01-01 RX ORDER — METOPROLOL TARTRATE 50 MG
50 TABLET ORAL DAILY
Refills: 0 | Status: DISCONTINUED | OUTPATIENT
Start: 2020-01-01 | End: 2020-01-01

## 2020-01-01 RX ORDER — ACETAMINOPHEN 500 MG
975 TABLET ORAL ONCE
Refills: 0 | Status: ACTIVE | OUTPATIENT
Start: 2020-01-01

## 2020-01-01 RX ORDER — SODIUM BICARBONATE 1 MEQ/ML
0.11 SYRINGE (ML) INTRAVENOUS
Qty: 75 | Refills: 0 | Status: DISCONTINUED | OUTPATIENT
Start: 2020-01-01 | End: 2020-01-01

## 2020-01-01 RX ORDER — SODIUM CHLORIDE 9 MG/ML
1000 INJECTION INTRAMUSCULAR; INTRAVENOUS; SUBCUTANEOUS ONCE
Refills: 0 | Status: COMPLETED | OUTPATIENT
Start: 2020-01-01 | End: 2020-01-01

## 2020-01-01 RX ORDER — PANTOPRAZOLE SODIUM 20 MG/1
40 TABLET, DELAYED RELEASE ORAL DAILY
Refills: 0 | Status: DISCONTINUED | OUTPATIENT
Start: 2020-01-01 | End: 2020-01-01

## 2020-01-01 RX ORDER — DEXAMETHASONE 0.5 MG/5ML
20 ELIXIR ORAL DAILY
Refills: 0 | Status: COMPLETED | OUTPATIENT
Start: 2020-01-01 | End: 2020-01-01

## 2020-01-01 RX ORDER — LIDOCAINE HCL 20 MG/ML
VIAL (ML) INJECTION
Refills: 0 | Status: DISCONTINUED | OUTPATIENT
Start: 2020-01-01 | End: 2020-01-01

## 2020-01-01 RX ORDER — PIPERACILLIN AND TAZOBACTAM 4; .5 G/20ML; G/20ML
3.38 INJECTION, POWDER, LYOPHILIZED, FOR SOLUTION INTRAVENOUS EVERY 12 HOURS
Refills: 0 | Status: ACTIVE | OUTPATIENT
Start: 2020-01-01 | End: 2021-11-04

## 2020-01-01 RX ORDER — DEXMEDETOMIDINE HYDROCHLORIDE IN 0.9% SODIUM CHLORIDE 4 UG/ML
0.3 INJECTION INTRAVENOUS
Qty: 200 | Refills: 0 | Status: ACTIVE | OUTPATIENT
Start: 2020-01-01 | End: 2021-11-02

## 2020-01-01 RX ORDER — LIDOCAINE HCL 20 MG/ML
5 VIAL (ML) INJECTION ONCE
Refills: 0 | Status: COMPLETED | OUTPATIENT
Start: 2020-01-01 | End: 2020-01-01

## 2020-01-01 RX ORDER — MORPHINE SULFATE 50 MG/1
1 CAPSULE, EXTENDED RELEASE ORAL ONCE
Refills: 0 | Status: DISCONTINUED | OUTPATIENT
Start: 2020-01-01 | End: 2020-01-01

## 2020-01-01 RX ORDER — SODIUM BICARBONATE 1 MEQ/ML
0.07 SYRINGE (ML) INTRAVENOUS
Qty: 75 | Refills: 0 | Status: DISCONTINUED | OUTPATIENT
Start: 2020-01-01 | End: 2020-01-01

## 2020-01-01 RX ORDER — ALBUTEROL 90 UG/1
2 AEROSOL, METERED ORAL EVERY 4 HOURS
Refills: 0 | Status: ACTIVE | OUTPATIENT
Start: 2020-01-01 | End: 2021-10-31

## 2020-01-01 RX ORDER — ROSUVASTATIN CALCIUM 5 MG/1
0 TABLET ORAL
Qty: 0 | Refills: 0 | DISCHARGE

## 2020-01-01 RX ORDER — EZETIMIBE 10 MG/1
0 TABLET ORAL
Qty: 0 | Refills: 2 | DISCHARGE

## 2020-01-01 RX ORDER — METOPROLOL TARTRATE 50 MG
0 TABLET ORAL
Qty: 0 | Refills: 0 | DISCHARGE

## 2020-01-01 RX ORDER — CHLORHEXIDINE GLUCONATE 213 G/1000ML
1 SOLUTION TOPICAL
Refills: 0 | Status: ACTIVE | OUTPATIENT
Start: 2020-01-01 | End: 2021-11-02

## 2020-01-01 RX ORDER — MUPIROCIN 20 MG/G
1 OINTMENT TOPICAL ONCE
Refills: 0 | Status: COMPLETED | OUTPATIENT
Start: 2020-01-01 | End: 2020-01-01

## 2020-01-01 RX ORDER — SODIUM CHLORIDE 9 MG/ML
500 INJECTION INTRAMUSCULAR; INTRAVENOUS; SUBCUTANEOUS ONCE
Refills: 0 | Status: COMPLETED | OUTPATIENT
Start: 2020-01-01 | End: 2020-01-01

## 2020-01-01 RX ORDER — INSULIN LISPRO 100/ML
VIAL (ML) SUBCUTANEOUS
Refills: 0 | Status: ACTIVE | OUTPATIENT
Start: 2020-01-01 | End: 2021-10-31

## 2020-01-01 RX ORDER — ACETAMINOPHEN 500 MG
650 TABLET ORAL EVERY 6 HOURS
Refills: 0 | Status: ACTIVE | OUTPATIENT
Start: 2020-01-01 | End: 2021-11-04

## 2020-01-01 RX ORDER — PANTOPRAZOLE SODIUM 20 MG/1
0 TABLET, DELAYED RELEASE ORAL
Qty: 0 | Refills: 1 | DISCHARGE

## 2020-01-01 RX ORDER — SODIUM CHLORIDE 9 MG/ML
1000 INJECTION, SOLUTION INTRAVENOUS
Refills: 0 | Status: ACTIVE | OUTPATIENT
Start: 2020-01-01 | End: 2021-11-05

## 2020-01-01 RX ORDER — GLUCAGON INJECTION, SOLUTION 0.5 MG/.1ML
1 INJECTION, SOLUTION SUBCUTANEOUS ONCE
Refills: 0 | Status: ACTIVE | OUTPATIENT
Start: 2020-01-01 | End: 2021-10-31

## 2020-01-01 RX ORDER — FAMOTIDINE 10 MG/ML
20 INJECTION INTRAVENOUS DAILY
Refills: 0 | Status: COMPLETED | OUTPATIENT
Start: 2020-01-01 | End: 2020-01-01

## 2020-01-01 RX ADMIN — Medication 166.67 MILLIGRAM(S): at 09:55

## 2020-01-01 RX ADMIN — MORPHINE SULFATE 2 MILLIGRAM(S): 50 CAPSULE, EXTENDED RELEASE ORAL at 23:56

## 2020-01-01 RX ADMIN — Medication 650 MILLIGRAM(S): at 13:31

## 2020-01-01 RX ADMIN — Medication 300 MILLIGRAM(S): at 15:30

## 2020-01-01 RX ADMIN — PANTOPRAZOLE SODIUM 40 MILLIGRAM(S): 20 TABLET, DELAYED RELEASE ORAL at 06:09

## 2020-01-01 RX ADMIN — MORPHINE SULFATE 1 MILLIGRAM(S): 50 CAPSULE, EXTENDED RELEASE ORAL at 22:55

## 2020-01-01 RX ADMIN — TAMSULOSIN HYDROCHLORIDE 0.4 MILLIGRAM(S): 0.4 CAPSULE ORAL at 21:40

## 2020-01-01 RX ADMIN — SODIUM CHLORIDE 50 MILLILITER(S): 9 INJECTION, SOLUTION INTRAVENOUS at 05:37

## 2020-01-01 RX ADMIN — CHLORHEXIDINE GLUCONATE 1 APPLICATION(S): 213 SOLUTION TOPICAL at 07:06

## 2020-01-01 RX ADMIN — FAMOTIDINE 20 MILLIGRAM(S): 10 INJECTION INTRAVENOUS at 13:06

## 2020-01-01 RX ADMIN — Medication 325 MILLIGRAM(S): at 12:12

## 2020-01-01 RX ADMIN — BUDESONIDE AND FORMOTEROL FUMARATE DIHYDRATE 2 PUFF(S): 160; 4.5 AEROSOL RESPIRATORY (INHALATION) at 09:26

## 2020-01-01 RX ADMIN — INSULIN GLARGINE 15 UNIT(S): 100 INJECTION, SOLUTION SUBCUTANEOUS at 23:00

## 2020-01-01 RX ADMIN — CHLORHEXIDINE GLUCONATE 1 APPLICATION(S): 213 SOLUTION TOPICAL at 05:30

## 2020-01-01 RX ADMIN — INSULIN GLARGINE 15 UNIT(S): 100 INJECTION, SOLUTION SUBCUTANEOUS at 08:50

## 2020-01-01 RX ADMIN — SODIUM CHLORIDE 125 MILLILITER(S): 9 INJECTION, SOLUTION INTRAVENOUS at 17:04

## 2020-01-01 RX ADMIN — SODIUM CHLORIDE 125 MILLILITER(S): 9 INJECTION, SOLUTION INTRAVENOUS at 23:32

## 2020-01-01 RX ADMIN — Medication 4: at 08:49

## 2020-01-01 RX ADMIN — MORPHINE SULFATE 1 MILLIGRAM(S): 50 CAPSULE, EXTENDED RELEASE ORAL at 22:53

## 2020-01-01 RX ADMIN — CHLORHEXIDINE GLUCONATE 1 APPLICATION(S): 213 SOLUTION TOPICAL at 05:34

## 2020-01-01 RX ADMIN — INSULIN GLARGINE 15 UNIT(S): 100 INJECTION, SOLUTION SUBCUTANEOUS at 22:09

## 2020-01-01 RX ADMIN — Medication 650 MILLIGRAM(S): at 17:37

## 2020-01-01 RX ADMIN — Medication 6 MILLIGRAM(S): at 20:56

## 2020-01-01 RX ADMIN — Medication 300 MILLIGRAM(S): at 13:07

## 2020-01-01 RX ADMIN — PIPERACILLIN AND TAZOBACTAM 25 GRAM(S): 4; .5 INJECTION, POWDER, LYOPHILIZED, FOR SOLUTION INTRAVENOUS at 17:24

## 2020-01-01 RX ADMIN — MORPHINE SULFATE 2 MILLIGRAM(S): 50 CAPSULE, EXTENDED RELEASE ORAL at 23:30

## 2020-01-01 RX ADMIN — Medication 10 MILLILITER(S): at 21:45

## 2020-01-01 RX ADMIN — SODIUM CHLORIDE 1000 MILLILITER(S): 9 INJECTION INTRAMUSCULAR; INTRAVENOUS; SUBCUTANEOUS at 07:23

## 2020-01-01 RX ADMIN — Medication 2: at 08:32

## 2020-01-01 RX ADMIN — CHLORHEXIDINE GLUCONATE 1 APPLICATION(S): 213 SOLUTION TOPICAL at 15:52

## 2020-01-01 RX ADMIN — Medication 6 MILLIGRAM(S): at 05:27

## 2020-01-01 RX ADMIN — INSULIN GLARGINE 15 UNIT(S): 100 INJECTION, SOLUTION SUBCUTANEOUS at 09:15

## 2020-01-01 RX ADMIN — SODIUM CHLORIDE 1000 MILLILITER(S): 9 INJECTION INTRAMUSCULAR; INTRAVENOUS; SUBCUTANEOUS at 21:35

## 2020-01-01 RX ADMIN — Medication 0.5 MILLIGRAM(S): at 05:12

## 2020-01-01 RX ADMIN — CHLORHEXIDINE GLUCONATE 1 APPLICATION(S): 213 SOLUTION TOPICAL at 06:34

## 2020-01-01 RX ADMIN — SODIUM CHLORIDE 125 MILLILITER(S): 9 INJECTION, SOLUTION INTRAVENOUS at 01:22

## 2020-01-01 RX ADMIN — Medication 110 MILLIGRAM(S): at 23:42

## 2020-01-01 RX ADMIN — PANTOPRAZOLE SODIUM 40 MILLIGRAM(S): 20 TABLET, DELAYED RELEASE ORAL at 06:24

## 2020-01-01 RX ADMIN — Medication 1000 MILLIGRAM(S): at 07:18

## 2020-01-01 RX ADMIN — Medication 2: at 08:27

## 2020-01-01 RX ADMIN — INSULIN GLARGINE 15 UNIT(S): 100 INJECTION, SOLUTION SUBCUTANEOUS at 08:13

## 2020-01-01 RX ADMIN — Medication 300 MILLIGRAM(S): at 06:44

## 2020-01-01 RX ADMIN — Medication 2: at 06:32

## 2020-01-01 RX ADMIN — Medication 2: at 16:42

## 2020-01-01 RX ADMIN — PIPERACILLIN AND TAZOBACTAM 25 GRAM(S): 4; .5 INJECTION, POWDER, LYOPHILIZED, FOR SOLUTION INTRAVENOUS at 05:12

## 2020-01-01 RX ADMIN — Medication 6: at 11:38

## 2020-01-01 RX ADMIN — PIPERACILLIN AND TAZOBACTAM 25 GRAM(S): 4; .5 INJECTION, POWDER, LYOPHILIZED, FOR SOLUTION INTRAVENOUS at 17:52

## 2020-01-01 RX ADMIN — Medication 110 MILLIGRAM(S): at 23:47

## 2020-01-01 RX ADMIN — Medication 2: at 12:02

## 2020-01-01 RX ADMIN — Medication 10 MILLILITER(S): at 06:32

## 2020-01-01 RX ADMIN — DEXMEDETOMIDINE HYDROCHLORIDE IN 0.9% SODIUM CHLORIDE 8.23 MICROGRAM(S)/KG/HR: 4 INJECTION INTRAVENOUS at 23:20

## 2020-01-01 RX ADMIN — Medication 40 MILLIGRAM(S): at 12:22

## 2020-01-01 RX ADMIN — Medication 100 MEQ/KG/HR: at 19:36

## 2020-01-01 RX ADMIN — CHLORHEXIDINE GLUCONATE 1 APPLICATION(S): 213 SOLUTION TOPICAL at 05:12

## 2020-01-01 RX ADMIN — DEXMEDETOMIDINE HYDROCHLORIDE IN 0.9% SODIUM CHLORIDE 8.23 MICROGRAM(S)/KG/HR: 4 INJECTION INTRAVENOUS at 12:09

## 2020-01-01 RX ADMIN — Medication 6 MILLIGRAM(S): at 06:24

## 2020-01-01 RX ADMIN — Medication 650 MILLIGRAM(S): at 20:56

## 2020-01-01 RX ADMIN — DEXMEDETOMIDINE HYDROCHLORIDE IN 0.9% SODIUM CHLORIDE 8.23 MICROGRAM(S)/KG/HR: 4 INJECTION INTRAVENOUS at 20:15

## 2020-01-01 RX ADMIN — Medication 110 MILLIGRAM(S): at 22:09

## 2020-01-01 RX ADMIN — PANTOPRAZOLE SODIUM 40 MILLIGRAM(S): 20 TABLET, DELAYED RELEASE ORAL at 11:40

## 2020-01-01 RX ADMIN — Medication 400 MILLIGRAM(S): at 05:37

## 2020-01-01 RX ADMIN — MORPHINE SULFATE 1 MILLIGRAM(S): 50 CAPSULE, EXTENDED RELEASE ORAL at 23:26

## 2020-01-01 RX ADMIN — ENOXAPARIN SODIUM 40 MILLIGRAM(S): 100 INJECTION SUBCUTANEOUS at 06:24

## 2020-01-01 RX ADMIN — FAMOTIDINE 20 MILLIGRAM(S): 10 INJECTION INTRAVENOUS at 06:32

## 2020-01-01 RX ADMIN — INSULIN GLARGINE 15 UNIT(S): 100 INJECTION, SOLUTION SUBCUTANEOUS at 08:33

## 2020-01-01 RX ADMIN — Medication 166.67 MILLIGRAM(S): at 10:00

## 2020-01-01 RX ADMIN — PIPERACILLIN AND TAZOBACTAM 25 GRAM(S): 4; .5 INJECTION, POWDER, LYOPHILIZED, FOR SOLUTION INTRAVENOUS at 06:33

## 2020-01-01 RX ADMIN — MORPHINE SULFATE 2 MILLIGRAM(S): 50 CAPSULE, EXTENDED RELEASE ORAL at 08:56

## 2020-01-01 RX ADMIN — MORPHINE SULFATE 2 MILLIGRAM(S): 50 CAPSULE, EXTENDED RELEASE ORAL at 22:00

## 2020-01-01 RX ADMIN — Medication 2: at 07:28

## 2020-01-01 RX ADMIN — Medication 325 MILLIGRAM(S): at 12:22

## 2020-01-01 RX ADMIN — Medication 5 MILLILITER(S): at 22:12

## 2020-01-01 RX ADMIN — Medication 50 MILLIGRAM(S): at 05:27

## 2020-01-01 RX ADMIN — MORPHINE SULFATE 2 MILLIGRAM(S): 50 CAPSULE, EXTENDED RELEASE ORAL at 22:15

## 2020-01-01 RX ADMIN — Medication 150 MEQ/KG/HR: at 15:12

## 2020-01-01 RX ADMIN — Medication 2: at 11:04

## 2020-01-01 RX ADMIN — Medication 2: at 16:16

## 2020-01-01 RX ADMIN — PIPERACILLIN AND TAZOBACTAM 200 GRAM(S): 4; .5 INJECTION, POWDER, LYOPHILIZED, FOR SOLUTION INTRAVENOUS at 23:56

## 2020-01-01 RX ADMIN — Medication 4: at 08:34

## 2020-01-01 RX ADMIN — Medication 110 MILLIGRAM(S): at 22:16

## 2020-01-01 RX ADMIN — Medication 110 MILLIGRAM(S): at 23:00

## 2020-01-01 RX ADMIN — DEXMEDETOMIDINE HYDROCHLORIDE IN 0.9% SODIUM CHLORIDE 8.23 MICROGRAM(S)/KG/HR: 4 INJECTION INTRAVENOUS at 10:00

## 2020-01-01 RX ADMIN — ENOXAPARIN SODIUM 40 MILLIGRAM(S): 100 INJECTION SUBCUTANEOUS at 20:57

## 2020-01-01 RX ADMIN — DEXMEDETOMIDINE HYDROCHLORIDE IN 0.9% SODIUM CHLORIDE 8.23 MICROGRAM(S)/KG/HR: 4 INJECTION INTRAVENOUS at 01:22

## 2020-01-01 RX ADMIN — SODIUM CHLORIDE 125 MILLILITER(S): 9 INJECTION, SOLUTION INTRAVENOUS at 08:34

## 2020-01-01 RX ADMIN — DEXMEDETOMIDINE HYDROCHLORIDE IN 0.9% SODIUM CHLORIDE 8.23 MICROGRAM(S)/KG/HR: 4 INJECTION INTRAVENOUS at 11:23

## 2020-01-01 RX ADMIN — Medication 400 MILLIGRAM(S): at 22:13

## 2020-01-01 RX ADMIN — SODIUM CHLORIDE 50 MILLILITER(S): 9 INJECTION, SOLUTION INTRAVENOUS at 12:25

## 2020-01-01 RX ADMIN — Medication 2: at 18:32

## 2020-01-01 RX ADMIN — PIPERACILLIN AND TAZOBACTAM 25 GRAM(S): 4; .5 INJECTION, POWDER, LYOPHILIZED, FOR SOLUTION INTRAVENOUS at 05:47

## 2020-01-01 RX ADMIN — DEXMEDETOMIDINE HYDROCHLORIDE IN 0.9% SODIUM CHLORIDE 8.23 MICROGRAM(S)/KG/HR: 4 INJECTION INTRAVENOUS at 06:45

## 2020-01-01 RX ADMIN — DEXMEDETOMIDINE HYDROCHLORIDE IN 0.9% SODIUM CHLORIDE 8.23 MICROGRAM(S)/KG/HR: 4 INJECTION INTRAVENOUS at 08:12

## 2020-01-01 RX ADMIN — Medication 2: at 11:29

## 2020-01-01 RX ADMIN — DEXMEDETOMIDINE HYDROCHLORIDE IN 0.9% SODIUM CHLORIDE 8.23 MICROGRAM(S)/KG/HR: 4 INJECTION INTRAVENOUS at 16:21

## 2020-01-01 RX ADMIN — PIPERACILLIN AND TAZOBACTAM 25 GRAM(S): 4; .5 INJECTION, POWDER, LYOPHILIZED, FOR SOLUTION INTRAVENOUS at 17:05

## 2020-01-01 RX ADMIN — FAMOTIDINE 20 MILLIGRAM(S): 10 INJECTION INTRAVENOUS at 12:04

## 2020-01-01 RX ADMIN — PIPERACILLIN AND TAZOBACTAM 25 GRAM(S): 4; .5 INJECTION, POWDER, LYOPHILIZED, FOR SOLUTION INTRAVENOUS at 05:35

## 2020-01-01 RX ADMIN — Medication 2: at 11:23

## 2020-01-01 RX ADMIN — Medication 2: at 12:40

## 2020-01-01 RX ADMIN — Medication 250 MILLIGRAM(S): at 09:16

## 2020-01-01 RX ADMIN — Medication 2: at 08:40

## 2020-01-01 RX ADMIN — Medication 100 MILLIGRAM(S): at 21:45

## 2020-01-01 RX ADMIN — Medication 2: at 12:09

## 2020-01-01 RX ADMIN — INSULIN GLARGINE 10 UNIT(S): 100 INJECTION, SOLUTION SUBCUTANEOUS at 22:12

## 2020-01-01 RX ADMIN — Medication 2: at 17:06

## 2020-01-01 RX ADMIN — Medication 1000 MILLIGRAM(S): at 00:00

## 2020-01-01 RX ADMIN — MUPIROCIN 1 APPLICATION(S): 20 OINTMENT TOPICAL at 05:34

## 2020-01-01 RX ADMIN — SODIUM CHLORIDE 500 MILLILITER(S): 9 INJECTION INTRAMUSCULAR; INTRAVENOUS; SUBCUTANEOUS at 14:19

## 2020-12-02 NOTE — ED ADULT NURSE NOTE - OBJECTIVE STATEMENT
pt arrived c/o sob. COVID pos on 11/26. sp02 84% on room air. respirations labored. tachypneic, audible wheezing noted. sinus tach on cm 108 bpm. placed on 6L nc and sp02 now 90-92%. Dr Fletcher at bedside. pt denies chest pain. c/o sob, body aches and sore throat. skin color wnl warm and dry. labs drawn. will continue to monitor.

## 2020-12-02 NOTE — H&P ADULT - CONVERSATION DETAILS
advanced care planning d/w patient and pt is a full code with wife to act as surrogate should he need medical decisions made on his behalf.

## 2020-12-02 NOTE — ED ADULT TRIAGE NOTE - CHIEF COMPLAINT QUOTE
pt reports "feeling like crap from the virus". pt was covid positive 11/27, as per EMS pt was 86% on RA, placed on 4L NC with improvement to 91%.

## 2020-12-02 NOTE — H&P ADULT - NSICDXPASTSURGICALHX_GEN_ALL_CORE_FT
PAST SURGICAL HISTORY:  S/P cardiac cath with MI at 59 and s/p 1 stent    S/P laminectomy with spinal fusion 2014

## 2020-12-02 NOTE — H&P ADULT - GASTROINTESTINAL DETAILS
no rebound tenderness/no rigidity/no masses palpable/nontender/no guarding/soft/bowel sounds normal/no organomegaly

## 2020-12-02 NOTE — ED PROVIDER NOTE - OBJECTIVE STATEMENT
74 y/o male hx obesity, dm, htn diangnosed with covid 6 days ago c/o worsening sob/aches, cough. Satting mid 80's on room air with ems/on arrival. Progressive decline. Subjective fevers, and generalized pain, not focal. no vomiting or diarrhea.     ROS: + fever/chills. No eye pain/changes in vision, No ear pain/sore throat/dysphagia, No chest pain/palpitations.  No abdominal pain, N/V/D, no black/bloody bm. No dysuria/frequency/discharge, No headache. No Dizziness.    No rashes or breaks in skin. No numbness/tingling/weakness.

## 2020-12-02 NOTE — ED PROVIDER NOTE - PHYSICAL EXAMINATION
Gen: unwell, uncomfortable, increased work of breathing  HEENT: Mucous membranes moist, pink conjunctivae, EOMI  CV: RRR, nl s1/s2.  Resp: b/l crackles. increased labor. satting id 80's on room air, 94% on 6L NC  GI: Abdomen soft, NT, ND. No rebound, no guarding  : No CVAT  Neuro: A&O x 3, moving all 4 extremities  MSK: No spine or joint tenderness to palpation  Skin: No rashes. intact and perfused.

## 2020-12-02 NOTE — H&P ADULT - HISTORY OF PRESENT ILLNESS
Pt is a 74 yo male with a pmh/o CAD s/p PCI x 1, MI at 58yo, DMII, HLD, HTN, who presents to ED today after progressive fatigue associated with malaise, generalized body aches and pains, chills, and non productive frequent worsening cough. Pt states his tenant was sick 4 days prior to thanksgiving and that he is the only sick contact he has had. Pt has otherwise been quarantining with wife at home and only left for Thanksgiving dinner with his son and child. Pt the next day went to  with tenant who was also still sick and pt, his wife, and tenant tested positive for COVID. Pt was placed on "steroid, nose spray, albuterol". Pt finished course today and presented to ED due to severe shortness of breath.    ED course: Arrived with , RR 28, pulse ox 86 on RA, placed on 5L NC with improvement to 95 at rest, T99. Pt given dexamethasone, Tylenol in ED and admitted to medicine.     Of note, pt reports stubbing right big toe with subsequent injury and has been home treating.     Denies fever, n/d, chills, cp, palpitations, leg swelling, HA, dysuria, abd pain, rash, recent travel.

## 2020-12-02 NOTE — H&P ADULT - NSICDXPASTMEDICALHX_GEN_ALL_CORE_FT
PAST MEDICAL HISTORY:  CAD (coronary artery disease) stent 2010    Heart attack     Hyperlipidemia     Hypertension

## 2020-12-03 NOTE — CONSULT NOTE ADULT - ASSESSMENT
75y  Male with h/o CAD s/p PCI x 1, MI at 59, DMII, HLD, HTN. Patient presented to the ER after progressive fatigue associated with malaise, generalized body aches and pains, chills, and non productive frequent worsening cough. He reports his tenant was sick 4 days prior to thanksgiving and ran into him when putting ut the garbage when the tenent told him he has a cough and has not been feeling well. Patient was ok up until Thanksgiving day when he started to have some symptoms. He has been quarantining with wife at home and only left for Thanksgiving dinner with his son and child. Both him and his wife went to get tested Friday for COVID and both came back positive. He was started onsteroid, nose spray, and albuterol. Due to worsening symptoms of shortness of breath he came to the ER. In the ER patient was noted to be hypoxic to 86% on RA and now on NFNC.      Acute Hypoxic respiratory failure  COVID-19 infection  B/L Pneumonia   Transaminitis   cough  fevers  shortness of breath      - COVID 19 PCR positive   - CXR reporting  multilobular pneumonia   - Continue supportive care measures  - continue to trend inflammatory markers.   - trend CBC with diff, CMP,  CRP, Ferritin,  procalcitonin q 48hours  - Avoid antibiotics unless there is a concern for a bacterial infection  - May consider vitamin C, thiamine and zinc (note lack of evidence to support benefit with COVID 19)  - Discussed Remdesivir with the patient.  - Due to transaminitis and BEREKET patient unable to receive Remdesivir  - Will consider Convalescent Plasma  - Dexamethasone 6mg Daily   - Patient needs to quarantine  for 14 days after discharge   - follow up all outstanding cultures  - Trend Fever  - Trend Leukocytosis    Will follow   75y  Male with h/o CAD s/p PCI x 1, MI at 59, DMII, HLD, HTN. Patient presented to the ER after progressive fatigue associated with malaise, generalized body aches and pains, chills, and non productive frequent worsening cough. He reports his tenant was sick 4 days prior to thanksgiving and ran into him when putting ut the garbage when the tenent told him he has a cough and has not been feeling well. Patient was ok up until Thanksgiving day when he started to have some symptoms. He has been quarantining with wife at home and only left for Thanksgiving dinner with his son and child. Both him and his wife went to get tested Friday for COVID and both came back positive. He was started onsteroid, nose spray, and albuterol. Due to worsening symptoms of shortness of breath he came to the ER. In the ER patient was noted to be hypoxic to 86% on RA and now on NFNC.      Acute Hypoxic respiratory failure  COVID-19 infection  B/L Pneumonia   Transaminitis   cough  fevers  shortness of breath      - COVID 19 PCR positive   - CXR reporting  multilobular pneumonia   - Continue supportive care measures  - continue to trend inflammatory markers.   - trend CBC with diff, CMP,  CRP, Ferritin,  procalcitonin q 48hours  - Avoid antibiotics unless there is a concern for a bacterial infection  - May consider vitamin C, thiamine and zinc (note lack of evidence to support benefit with COVID 19)  - Discussed Remdesivir with the patient.  - Due to transaminitis and BEREKET patient unable to receive Remdesivir  - Will consent patient for Convalescent Plasma, discussed risks and AE, patient agreeable to trial Convalescent Plasma  - Transfuse Convalescent Plasma  - Dexamethasone 6mg Daily   - Patient needs to quarantine  for 14 days after discharge   - follow up all outstanding cultures  - Trend Fever  - Trend Leukocytosis    Will follow

## 2020-12-03 NOTE — CONSULT NOTE ADULT - SUBJECTIVE AND OBJECTIVE BOX
MRN-[id number]  CYNDIE INGRID    CC: Patient is a 75y old  Male who presents with a chief complaint of Acute hypoxic resp distress secondary to COVID, sepsis (03 Dec 2020 09:19)    75y  Male with h/o CAD s/p PCI x 1, MI at 59, DMII, HLD, HTN. Patient presented to the ER after progressive fatigue associated with malaise, generalized body aches and pains, chills, and non productive frequent worsening cough. He reports his tenant was sick 4 days prior to thanksgiving and ran into him when putting ut the garbage when the tenent told him he has a cough and has not been feeling well. Patient was ok up until Thanksgiving day when he started to have some symptoms. He has been quarantining with wife at home and only left for Thanksgiving dinner with his son and child. Both him and his wife went to get tested Friday for COVID and both came back positive. He was started onsteroid, nose spray, and albuterol. Due to worsening symptoms of shortness of breath he came to the ER. In the ER patient was noted to be hypoxic to 86% on RA and now on NFNC. ID input requested.     Past Medical & Surgical Hx:  Heart attack  Hyperlipidemia  Hypertension  CAD (coronary artery disease) stent 2010  S/P cardiac cath with MI at 59 and s/p 1 stent  S/P laminectomy with spinal fusion 2014    Social Hx:  Former smoker from the ages of 15 to 30    FAMILY HISTORY:  FH: colon cancer in mother and father    Allergies  No Known Allergies    REVIEW OF SYSTEMS:  CONSTITUTIONAL:  No Fever or chills  HEENT:  No diplopia or blurred vision.  No earache, sore throat or runny nose.  CARDIOVASCULAR:  No pressure, squeezing, strangling, tightness, heaviness or aching about the chest, neck, axilla or epigastrium.  RESPIRATORY:  + cough, + shortness of breath  GASTROINTESTINAL:  No nausea, vomiting or diarrhea.  GENITOURINARY:  No dysuria, frequency or urgency. No Blood in urine  MUSCULOSKELETAL:  no joint aches, no muscle pain  SKIN:  No change in skin, hair or nails.  NEUROLOGIC:  No Headaches, seizures or weakness.  PSYCHIATRIC:  No disorder of thought or mood.  ENDOCRINE:  No heat or cold intolerance  HEMATOLOGICAL:  No easy bruising or bleeding.     Physical Exam:  GEN: In moderate respiratory distress   HEENT: normocephalic and atraumatic. EOMI. PERRL.  Anicteric  NECK: Supple.   LUNGS: Clear to auscultation.  HEART: Regular rate and rhythm   ABDOMEN: Soft, nontender, and nondistended.  Positive bowel sounds.    : No CVA tenderness  EXTREMITIES: Without any edema.  MSK: No joint swelling  NEUROLOGIC:  No Focal Deficits  PSYCHIATRIC: Appropriate affect .  SKIN: No Rash    Height (cm): 170.2 (12-03 @ 00:42)  Weight (kg): 104.5 (12-03 @ 00:42)  BMI (kg/m2): 36.1 (12-03 @ 00:42)  BSA (m2): 2.15 (12-03 @ 00:42)    Vitals:  T(F): 98.3 (03 Dec 2020 11:13), Max: 99.8 (02 Dec 2020 20:19)  HR: 94 (03 Dec 2020 11:13)  BP: 114/62 (03 Dec 2020 11:13)  RR: 22 (03 Dec 2020 11:13)  SpO2: 95% (03 Dec 2020 11:13) (86% - 97%)  temp max in last 48H T(F): , Max: 99.8 (12-02-20 @ 20:19)    Current  medications:    aspirin 325 milliGRAM(s) Oral daily  dexAMETHasone  Injectable 6 milliGRAM(s) IV Push daily  dextrose 40% Gel 15 Gram(s) Oral once  dextrose 5%. 1000 milliLiter(s) IV Continuous <Continuous>  dextrose 5%. 1000 milliLiter(s) IV Continuous <Continuous>  dextrose 50% Injectable 25 Gram(s) IV Push once  dextrose 50% Injectable 12.5 Gram(s) IV Push once  dextrose 50% Injectable 25 Gram(s) IV Push once  enoxaparin Injectable 40 milliGRAM(s) SubCutaneous every 12 hours  glucagon  Injectable 1 milliGRAM(s) IntraMuscular once  insulin lispro (ADMELOG) corrective regimen sliding scale   SubCutaneous three times a day before meals  metoprolol succinate ER 50 milliGRAM(s) Oral daily  pantoprazole    Tablet 40 milliGRAM(s) Oral before breakfast  tamsulosin 0.4 milliGRAM(s) Oral at bedtime    Labs:             13.3   14.98 )-----------( 289      ( 03 Dec 2020 06:40 )             39.4      12-03    141  |  103  |  50.0<H>  ----------------------------<  166<H>  4.2   |  25.0  |  2.74<H>    Ca    8.9      03 Dec 2020 06:40    TPro  7.4  /  Alb  3.2<L>  /  TBili  0.5  /  DBili  x   /  AST  269<H>  /  ALT  271<H>  /  AlkPhos  175<H>  12-03    WBC Count: 14.98 K/uL (12-03-20 @ 06:40)  WBC Count: 14.34 K/uL (12-02-20 @ 21:03)    Creatinine, Serum: 2.74 mg/dL (12-03-20 @ 06:40)  Creatinine, Serum: 2.75 mg/dL (12-02-20 @ 21:03)    C-Reactive Protein, Serum: >42.00 mg/dL (12-02-20 @ 21:03)    Ferritin, Serum: 794 ng/mL (12-02-20 @ 21:03)    Procalcitonin, Serum: 1.61 ng/mL (12-02-20 @ 21:03)     Rapid RVP Result: NotDetec (12-02-20 @ 21:21)  SARS-CoV-2: Detected (12-02-20 @ 21:21)    Xray Chest 1 View-PORTABLE IMMEDIATE (12.02.20 @ 21:48)     EXAM:  XR CHEST PORTABLE IMMED 1V                          PROCEDURE DATE:  12/02/2020      INTERPRETATION:  TECHNIQUE: Single portable view of the chest.    COMPARISON: None.    CLINICAL HISTORY: Shortness of Breath, Cough,  Fever    FINDINGS:    Single frontal view of the chest demonstrates diffuse bilateral infiltrates. The cardiomediastinal silhouette is enlarged. No acute osseous abnormalities. Overlying EKG leads and wires are noted    IMPRESSION: Multilobar pneumonia.    75y  Male with h/o CAD s/p PCI x 1, MI at 59, DMII, HLD, HTN. Patient presented to the ER after progressive fatigue associated with malaise, generalized body aches and pains, chills, and non productive frequent worsening cough. He reports his tenant was sick 4 days prior to thanksgiving and ran into him when putting ut the garbage when the tenent told him he has a cough and has not been feeling well. Patient was ok up until Thanksgiving day when he started to have some symptoms. He has been quarantining with wife at home and only left for Thanksgiving dinner with his son and child. Both him and his wife went to get tested Friday for COVID and both came back positive. He was started onsteroid, nose spray, and albuterol. Due to worsening symptoms of shortness of breath he came to the ER. In the ER patient was noted to be hypoxic to 86% on RA and now on NFNC.    Acute Hypoxic respiratory failure & Multi Focal Pneumonia,   COVID-19 infection  Transaminitis   BEREKET    - COVID 19 PCR positive   - CXR reporting  multilobular pneumonia   - continue to trend inflammatory markers.   - trend CBC with diff, CMP,  CRP, Ferritin,  procalcitonin q 48hours  - Avoid antibiotics unless there is a concern for a bacterial infection  - May consider vitamin C, thiamine and zinc (note lack of evidence to support benefit with COVID 19)  - ? Remdesivir ( Due to transaminitis and BEREKET patient unable to receive Remdesivir)  - Considering  Convalescent Plasma  - On Dexamethasone 6mg Daily

## 2020-12-03 NOTE — CHART NOTE - NSCHARTNOTEFT_GEN_A_CORE
repeat labs and lactic acid improved  will decrease bicarb drip  will continue to monitor patient closely. repeat labs and lactic acid improved  will stop bicarb drip  will continue to monitor patient closely. repeat labs and lactic acid improved  will stop bicarb drip as patient's with covid tend to easily develop volume overload.   will continue to monitor patient closely.

## 2020-12-03 NOTE — PROGRESS NOTE ADULT - SUBJECTIVE AND OBJECTIVE BOX
CYNDIE QUINTERO  75y  Male    Interval/overnight events/pt complaints:  Patient seen and examined at the bedside by hospitalist and PA  Patient was hypotensive this AM, which improved after a bolus of fluid  Patient c/o sob, cough and dyspnea on minimal exertion  VSS during examine     MEDICATIONS  (STANDING):  aspirin 325 milliGRAM(s) Oral daily  dexAMETHasone  Injectable 6 milliGRAM(s) IV Push daily  dextrose 40% Gel 15 Gram(s) Oral once  dextrose 5%. 1000 milliLiter(s) (50 mL/Hr) IV Continuous <Continuous>  dextrose 5%. 1000 milliLiter(s) (100 mL/Hr) IV Continuous <Continuous>  dextrose 50% Injectable 25 Gram(s) IV Push once  dextrose 50% Injectable 12.5 Gram(s) IV Push once  dextrose 50% Injectable 25 Gram(s) IV Push once  enoxaparin Injectable 40 milliGRAM(s) SubCutaneous every 12 hours  glucagon  Injectable 1 milliGRAM(s) IntraMuscular once  insulin lispro (ADMELOG) corrective regimen sliding scale   SubCutaneous three times a day before meals  metoprolol succinate ER 50 milliGRAM(s) Oral daily  pantoprazole    Tablet 40 milliGRAM(s) Oral before breakfast  tamsulosin 0.4 milliGRAM(s) Oral at bedtime    MEDICATIONS  (PRN):  acetaminophen   Tablet .. 650 milliGRAM(s) Oral every 4 hours PRN Temp greater or equal to 38.5C (101.3F)  ALBUTerol    90 MICROgram(s) HFA Inhaler 2 Puff(s) Inhalation every 4 hours PRN Shortness of Breath and/or Wheezing  benzonatate 100 milliGRAM(s) Oral three times a day PRN Cough  guaifenesin/dextromethorphan  Syrup 10 milliLiter(s) Oral every 4 hours PRN Cough    AllergiesNo Known Allergies    Vital Signs Last 24 Hrs  T(C): 36.6 (03 Dec 2020 04:51), Max: 37.7 (02 Dec 2020 20:19)  T(F): 97.8 (03 Dec 2020 04:51), Max: 99.8 (02 Dec 2020 20:19)  HR: 96 (03 Dec 2020 08:20) (87 - 110)  BP: 109/51 (03 Dec 2020 08:20) (80/39 - 121/60)  BP(mean): --  RR: 26 (03 Dec 2020 08:20) (22 - 33)  SpO2: 94% (03 Dec 2020 08:20) (86% - 97%)  I&O's Summary    03 Dec 2020 07:01  -  03 Dec 2020 09:20  --------------------------------------------------------  IN: 0 mL / OUT: 224 mL / NET: -224 mL    PHYSICAL EXAM:  GENERAL: NAD, nontoxic appearing  HEENT - EOMI, pupils equal and reactive to light;  Moist mucous membranes, Good dentition, No lesions  NECK: Supple, No JVD, no adenopathy  CHEST/LUNG: Clear to auscultation bilaterally; No rales, rhonchi, wheezing  HEART: Regular rate and rhythm; No murmurs, rubs, or gallops  ABDOMEN: Soft, Nontender, Nondistended; Bowel sounds present  EXTREMITIES:  2+ Peripheral Pulses, No clubbing, cyanosis, edema, calf tenderness  NEURO:  No Focal deficits, sensory and motor intact  SKIN: No rashes or lesions, warm, dry    CAPILLARY BLOOD GLUCOSE      POCT Blood Glucose.: 171 mg/dL (03 Dec 2020 08:26)      LABS    (12-03 @ 06:40)                      13.3  14.98 )-----------( 289                 39.4    Neutrophils = 13.64 (91.1%)  Lymphocytes = 0.80 (5.3%)  Eosinophils = 0.03 (0.2%)  Basophils = 0.03 (0.2%)  Monocytes = 0.42 (2.8%)  Bands = --%    12-03    141  |  103  |  50.0<H>  ----------------------------<  166<H>  4.2   |  25.0  |  2.74<H>    Ca    8.9      03 Dec 2020 06:40    TPro  7.4  /  Alb  3.2<L>  /  TBili  0.5  /  DBili  x   /  AST  269<H>  /  ALT  271<H>  /  AlkPhos  175<H>  12-03    ( 03 Dec 2020 06:40 )   PT: 15.0 sec;   INR: 1.31 ratio;       PTT:28.9 sec      (12-02 @ 21:21)  NotDete    IMAGING    Imaging Personally Reviewed:  [ X] YES  [ ] NO    Consultant(s) Notes Reviewed:  [x ] YES  [ ] NO   CYNDIE QUINTERO  75y  Male    Interval/overnight events/pt complaints:  Patient seen and examined at the bedside by hospitalist and PA  Patient was hypotensive this AM, which improved after a bolus of fluid  Patient c/o sob, cough and dyspnea on minimal exertion  VSS during examine     MEDICATIONS  (STANDING):  aspirin 325 milliGRAM(s) Oral daily  dexAMETHasone  Injectable 6 milliGRAM(s) IV Push daily  dextrose 40% Gel 15 Gram(s) Oral once  dextrose 5%. 1000 milliLiter(s) (50 mL/Hr) IV Continuous <Continuous>  dextrose 5%. 1000 milliLiter(s) (100 mL/Hr) IV Continuous <Continuous>  dextrose 50% Injectable 25 Gram(s) IV Push once  dextrose 50% Injectable 12.5 Gram(s) IV Push once  dextrose 50% Injectable 25 Gram(s) IV Push once  enoxaparin Injectable 40 milliGRAM(s) SubCutaneous every 12 hours  glucagon  Injectable 1 milliGRAM(s) IntraMuscular once  insulin lispro (ADMELOG) corrective regimen sliding scale   SubCutaneous three times a day before meals  metoprolol succinate ER 50 milliGRAM(s) Oral daily  pantoprazole    Tablet 40 milliGRAM(s) Oral before breakfast  tamsulosin 0.4 milliGRAM(s) Oral at bedtime    MEDICATIONS  (PRN):  acetaminophen   Tablet .. 650 milliGRAM(s) Oral every 4 hours PRN Temp greater or equal to 38.5C (101.3F)  ALBUTerol    90 MICROgram(s) HFA Inhaler 2 Puff(s) Inhalation every 4 hours PRN Shortness of Breath and/or Wheezing  benzonatate 100 milliGRAM(s) Oral three times a day PRN Cough  guaifenesin/dextromethorphan  Syrup 10 milliLiter(s) Oral every 4 hours PRN Cough    AllergiesNo Known Allergies    Vital Signs Last 24 Hrs  T(C): 36.6 (03 Dec 2020 04:51), Max: 37.7 (02 Dec 2020 20:19)  T(F): 97.8 (03 Dec 2020 04:51), Max: 99.8 (02 Dec 2020 20:19)  HR: 96 (03 Dec 2020 08:20) (87 - 110)  BP: 109/51 (03 Dec 2020 08:20) (80/39 - 121/60)  BP(mean): --  RR: 26 (03 Dec 2020 08:20) (22 - 33)  SpO2: 94% (03 Dec 2020 08:20) (86% - 97%)  I&O's Summary    03 Dec 2020 07:01  -  03 Dec 2020 09:20  --------------------------------------------------------  IN: 0 mL / OUT: 224 mL / NET: -224 mL    PHYSICAL EXAM:  GENERAL: NAD  NECK: Supple  CHEST/LUNG: increased WOB, in prone position; +cough, CTA b/l; No rales, rhonchi, wheezing  HEART: Regular rate and rhythm; No murmurs, rubs, or gallops  ABDOMEN: Soft, obese, Nontender, Nondistended; Bowel sounds present  EXTREMITIES:  2+ Peripheral Pulses, No clubbing, cyanosis, edema, calf tenderness  NEURO:  No Focal deficits, sensory and motor intact  SKIN: warm and dry    CAPILLARY BLOOD GLUCOSE      POCT Blood Glucose.: 171 mg/dL (03 Dec 2020 08:26)      LABS    (12-03 @ 06:40)                      13.3  14.98 )-----------( 289                 39.4    Neutrophils = 13.64 (91.1%)  Lymphocytes = 0.80 (5.3%)  Eosinophils = 0.03 (0.2%)  Basophils = 0.03 (0.2%)  Monocytes = 0.42 (2.8%)  Bands = --%    12-03    141  |  103  |  50.0<H>  ----------------------------<  166<H>  4.2   |  25.0  |  2.74<H>    Ca    8.9      03 Dec 2020 06:40    TPro  7.4  /  Alb  3.2<L>  /  TBili  0.5  /  DBili  x   /  AST  269<H>  /  ALT  271<H>  /  AlkPhos  175<H>  12-03    ( 03 Dec 2020 06:40 )   PT: 15.0 sec;   INR: 1.31 ratio;       PTT:28.9 sec      (12-02 @ 21:21)  NotDete    IMAGING    Imaging Personally Reviewed:  [ X] YES  [ ] NO    Consultant(s) Notes Reviewed:  [x ] YES  [ ] NO

## 2020-12-03 NOTE — CHART NOTE - NSCHARTNOTEFT_GEN_A_CORE
Notified by RN patient desaturated to high 60s/low 70s with movement. Placed on high flow and NRB. Sats increased to high 90s.  stat abg done and reviewed with ICU.   LA 4.6    plan:  - will bolus 500cc and repeat lactic acid in 4 hours  - encourage proning  - plasma today, ID has ordered  - family updated  - will repeat abg  - appreciated Critical care eval, remain on medical floor. Notified by RN patient desaturated to high 60s/low 70s with movement. Placed on high flow and NRB. Sats increased to high 90s.  stat abg done and reviewed with ICU.   LA 4.6    plan:  - will bolus 500cc and repeat lactic acid in 4 hours  - encourage proning  - plasma today, ID has ordered. No role for abc per ID.   - family updated  - will repeat abg  - appreciated Critical care eval, remain on medical floor.

## 2020-12-03 NOTE — CONSULT NOTE ADULT - SUBJECTIVE AND OBJECTIVE BOX
Patient is a 75y old  Male who presents with a chief complaint of Acute hypoxic resp distress secondary to COVID, sepsis (03 Dec 2020 14:05)    BRIEF HOSPITAL COURSE:   75M w/ PMHx DM, HTN, CAD s/p PCI x 1 was admitted on 12/02 w/ fatigue, fever/ chills, cough and tested +ve for COVID-19. ICU consulted today for worsening SOB/ hypoxemia. On evaluation pt was on 100% HFNC, laying on his side w/ SpO2 ~ 98-99%. Comfortable w/ no increased WOB and speaking in full sentences.    PAST MEDICAL & SURGICAL HISTORY:  Heart attack  Hyperlipidemia  Hypertension  CAD (coronary artery disease)  stent 2010  S/P cardiac cath  with MI at 59 and s/p 1 stent  S/P laminectomy with spinal fusion  2014    Review of Systems:  CONSTITUTIONAL: No fever, chills, or fatigue  EYES: No eye pain, visual disturbances, or discharge  ENMT:  No difficulty hearing, tinnitus, vertigo; No sinus or throat pain  NECK: No pain or stiffness  RESPIRATORY: No cough, wheezing, chills or hemoptysis; + shortness of breath  CARDIOVASCULAR: No chest pain, palpitations, dizziness, or leg swelling  GASTROINTESTINAL: No abdominal or epigastric pain. No nausea, vomiting, or hematemesis; No diarrhea or constipation. No melena or hematochezia.  GENITOURINARY: No dysuria, frequency, hematuria, or incontinence  NEUROLOGICAL: No headaches, memory loss, loss of strength, numbness, or tremors  SKIN: No itching, burning, rashes, or lesions   MUSCULOSKELETAL: No joint pain or swelling; No muscle, back, or extremity pain  PSYCHIATRIC: No depression, anxiety, mood swings, or difficulty sleeping      Physical Examination:    ICU Vital Signs Last 24 Hrs  T(C): 36.8 (03 Dec 2020 11:13), Max: 37.7 (02 Dec 2020 20:19)  T(F): 98.3 (03 Dec 2020 11:13), Max: 99.8 (02 Dec 2020 20:19)  HR: 94 (03 Dec 2020 11:13) (87 - 110)  BP: 114/62 (03 Dec 2020 11:13) (80/39 - 121/60)  BP(mean): --  ABP: --  ABP(mean): --  RR: 22 (03 Dec 2020 11:13) (22 - 33)  SpO2: 95% (03 Dec 2020 11:13) (86% - 97%)      Neuro: AAO*3, No motor, sensory, or cranial nerve deficit    HEENT: Pupils equal, reactive to light, Moist oral mucosa    PULM: Decreased to auscultation bilaterally    CVS: Regular rhythm and controlled rate, no murmurs, rubs, or gallops    ABD: Soft, nondistended, nontender, normoactive bowel sounds    EXT: No b/l LE edema, nontender with pedal pulse palpable     SKIN: Warm and well perfused, no acute rashes       Medications:    metoprolol succinate ER 50 milliGRAM(s) Oral daily  tamsulosin 0.4 milliGRAM(s) Oral at bedtime    ALBUTerol    90 MICROgram(s) HFA Inhaler 2 Puff(s) Inhalation every 4 hours PRN  benzonatate 100 milliGRAM(s) Oral three times a day PRN  guaifenesin/dextromethorphan  Syrup 10 milliLiter(s) Oral every 4 hours PRN    acetaminophen   Tablet .. 650 milliGRAM(s) Oral every 4 hours PRN  aspirin 325 milliGRAM(s) Oral daily      enoxaparin Injectable 40 milliGRAM(s) SubCutaneous every 12 hours    pantoprazole    Tablet 40 milliGRAM(s) Oral before breakfast      dexAMETHasone  Injectable 6 milliGRAM(s) IV Push daily  dextrose 40% Gel 15 Gram(s) Oral once  dextrose 50% Injectable 25 Gram(s) IV Push once  dextrose 50% Injectable 12.5 Gram(s) IV Push once  dextrose 50% Injectable 25 Gram(s) IV Push once  glucagon  Injectable 1 milliGRAM(s) IntraMuscular once  insulin lispro (ADMELOG) corrective regimen sliding scale   SubCutaneous three times a day before meals    dextrose 5%. 1000 milliLiter(s) IV Continuous <Continuous>  dextrose 5%. 1000 milliLiter(s) IV Continuous <Continuous>  sodium bicarbonate  Infusion 0.108 mEq/kG/Hr IV Continuous <Continuous>      mupirocin 2% Ointment 1 Application(s) Topical once            I&O's Detail    03 Dec 2020 07:01  -  03 Dec 2020 14:28  --------------------------------------------------------  IN:  Total IN: 0 mL    OUT:    Estimated Blood Loss (mL): 224 mL  Total OUT: 224 mL    Total NET: -224 mL            RADIOLOGY/ Microbiology/ Labs: reviewed

## 2020-12-03 NOTE — CONSULT NOTE ADULT - SUBJECTIVE AND OBJECTIVE BOX
Chief Complaint : Patient is a 75y old  Male who presents with a chief complaint of Acute hypoxic resp distress secondary to COVID, sepsis (03 Dec 2020 09:19)    HPI : HPI:  Pt is a 76 yo male with a pmh/o CAD s/p PCI x 1, MI at 60yo, DMII, HLD, HTN, who presents to ED today after progressive fatigue associated with malaise, generalized body aches and pains, chills, and non productive frequent worsening cough. Pt states his tenant was sick 4 days prior to thanksgiving and that he is the only sick contact he has had. Pt has otherwise been quarantining with wife at home and only left for Thanksgiving dinner with his son and child. Pt the next day went to  with tenant who was also still sick and pt, his wife, and tenant tested positive for COVID. Pt was placed on "steroid, nose spray, albuterol". Pt finished course today and presented to ED due to severe shortness of breath.    ED course: Arrived with , RR 28, pulse ox 86 on RA, placed on 5L NC with improvement to 95 at rest, T99. Pt given dexamethasone, Tylenol in ED and admitted to medicine.     Of note, pt reports stubbing right big toe with subsequent injury and has been home treating.     Denies fever, n/d, chills, cp, palpitations, leg swelling, HA, dysuria, abd pain, rash, recent travel.  (02 Dec 2020 23:30)      Podiatry HPI: History as above. Podiatry consulted for right hallux wound. Patient seen at bedside on oxygen support. Reports having stubbed his toe about 3 days ago. Did not seek formal treatment. Patient does not have a podiatrist, reports pain to the right great toe. Slick numbness tingling to toes.  Denies f/c/n/v, intermittent SOB, positive cough.     PMH: Heart attack    Hyperlipidemia    Hypertension    CAD (coronary artery disease)      PSH:S/P cardiac cath    S/P laminectomy with spinal fusion    Allergies: No Known Allergies    REVIEW OF SYSTEMS:    CONSTITUTIONAL: No weakness, fevers or chills  EYES/ENT: No visual changes;  No vertigo or throat pain   NECK: No pain or stiffness  RESPIRATORY: positive cough, no wheezing, hemoptysis; positive shortness of breath  CARDIOVASCULAR: No chest pain or palpitations  GASTROINTESTINAL: No abdominal or epigastric pain. No nausea, vomiting, or hematemesis; No diarrhea or constipation. No melena or hematochezia.  GENITOURINARY: No dysuria, frequency or hematuria  NEUROLOGICAL: No numbness or weakness  SKIN: No itching, rashes, wound right great toe      Labs:                          13.3   14.98 )-----------( 289      ( 03 Dec 2020 06:40 )             39.4     WBC Trend  14.98<H> Date (12-03 @ 06:40)  14.34<H> Date (12-02 @ 21:03)      Chem  12-03    141  |  103  |  50.0<H>  ----------------------------<  166<H>  4.2   |  25.0  |  2.74<H>    Ca    8.9      03 Dec 2020 06:40    TPro  7.4  /  Alb  3.2<L>  /  TBili  0.5  /  DBili  x   /  AST  269<H>  /  ALT  271<H>  /  AlkPhos  175<H>  12-03          T(F): 97.8 (12-03-20 @ 04:51), Max: 99.8 (12-02-20 @ 20:19)  HR: 96 (12-03-20 @ 08:20) (87 - 110)  BP: 109/51 (12-03-20 @ 08:20) (80/39 - 121/60)  RR: 26 (12-03-20 @ 08:20) (22 - 33)  SpO2: 94% (12-03-20 @ 08:20) (86% - 97%)  Wt(kg): --    O:   General: Pleasant  male NAD & AOX3. Right foot focused    Derm:  Skin warm, dry and supple bilateral.  Loose right hallucal nail with dried heme underneath. Nail bed inspected post removal of hallux nail, no laceration or open lesion, no active bleeding or discharge. minimal erythema, no edema. Elongated and thickened toenails b/l.   Vascular: Dorsalis Pedis and Posterior Tibial pulses easily palpable. Pedal hair present.  CFT < 3 seconds digits 1-5 bilateral.    Neuro: Protective sensation intact to the level of the digits bilateral.  MSK: Muscle strength 5/5 all major muscle groups bilateral. Pain to palpation right hallux     Xray pending to r/o fracture       A: Right hallux stubbed toe      s/p nail removal bedside, no nail bed laceration    P:   Chart reviewed and Patient evaluated  Discussed diagnosis and treatment with patient  Wound flush with normal saline  Nail bed inspected without laceration  Applied betadine with dry sterile dressing  Rx. mupirocin   Mupirocin with DSD or bandaid   X-rays right foot pending to r/o fracture  Weight bearing as tolerated  Rest per primary team  Discussed importance of daily foot examinations and proper shoe gear and to importance of lower Fasting Blood Glucose levels.   HbA1c 6.3  Patient is stable from podiatry standpoint  Outpatient follow up with Dr. Craig for diabetic foot care  Discussed with Attending Dr. Craig    Wound care:  Clean wound with saline. Apply thin layer of mupirocin cover with DSD or bandaid.

## 2020-12-03 NOTE — CHART NOTE - NSCHARTNOTEFT_GEN_A_CORE
Pt pulled off O2, when O2 put back on O2 sat maintaining at 75%. Pt has High flow NC and nonrebreather on  B/P 147/74   R 45 PO 75%  Pt proning  1 mg Morphine IVP  ICU consulted earlier  Agree with 1 more mg of Morphine and BIPAP if needed  ICU to reconsult if needed  pt  appears more comfortable  PO 92% R 30   Ativan 0.5 mg IVP for anxiety prn  BIPAP prn  Continue to monitor Pt pulled off O2, when O2 put back on O2 sat maintaining at 75%. Pt has High flow NC and nonrebreather on  B/P 147/74   R 45 PO 75%  Pt proning  1 mg Morphine IVP  ICU consulted earlier  Agree with 1 more mg of Morphine and BIPAP if needed  ICU to reconsult if needed  pt  appears more comfortable  PO 92% R 30   Ativan 0.5 mg IVP for anxiety prn  BIPAP ordered prn  Continue to monitor Pt pulled off O2, when O2 put back on O2 sat maintaining at 75%. Pt has High flow NC and nonrebreather on  B/P 147/74   R 45 PO 75%  Pt proning  1 mg Morphine IVP  ICU consulted earlier  Called to discuss  Agree with 1 more mg of Morphine and BIPAP if needed  ICU to reconsult if needed  pt  appears more comfortable  PO 92% R 30   Ativan 0.5 mg IVP for anxiety prn  BIPAP ordered prn  Continue to monitor

## 2020-12-03 NOTE — GOALS OF CARE CONVERSATION - ADVANCED CARE PLANNING - CONVERSATION DETAILS
We discussed diagnosis of covid and he is requiring high amounts of oxygen. There is a high likelihood he may require intubation. Patient states he does not want to die and would be agreeable to intubation and CPR if needed. Wife notified as well.

## 2020-12-03 NOTE — CONSULT NOTE ADULT - SUBJECTIVE AND OBJECTIVE BOX
[facility]  INFECTIOUS DISEASES AND INTERNAL MEDICINE at [place]  =======================================================  Anatoliy Hall MD  Diplomates American Board of Internal Medicine and Infectious Diseases  Tel: [phone]      Fax: [phone]  =======================================================      [id number]  CYNDIE INGRID    CC: Patient is a 75y old  Male who presents with a chief complaint of Acute hypoxic resp distress secondary to COVID, sepsis (03 Dec 2020 09:19)      75y  Male with h/o CAD s/p PCI x 1, MI at 59, DMII, HLD, HTN. Patient presented to the ER after progressive fatigue associated with malaise, generalized body aches and pains, chills, and non productive frequent worsening cough. He reports his tenant was sick 4 days prior to thanksgiving and ran into him when putting ut the garbage when the tenent told him he has a cough and has not been feeling well. Patient was ok up until Thanksgiving day when he started to have some symptoms. He has been quarantining with wife at home and only left for Thanksgiving dinner with his son and child. Both him and his wife went to get tested Friday for COVID and both came back positive. He was started onsteroid, nose spray, and albuterol. Due to worsening symptoms of shortness of breath he came to the ER. In the ER patient was noted to be hypoxic to 86% on RA and now on NFNC. ID input requested.       Past Medical & Surgical Hx:  Heart attack  Hyperlipidemia  Hypertension  CAD (coronary artery disease) stent 2010  S/P cardiac cath with MI at 59 and s/p 1 stent  S/P laminectomy with spinal fusion 2014      Social Hx:  Former smoker from the ages of 15 to 30      FAMILY HISTORY:  FH: colon cancer in mother and father      Allergies  No Known Allergies       REVIEW OF SYSTEMS:  CONSTITUTIONAL:  No Fever or chills  HEENT:  No diplopia or blurred vision.  No earache, sore throat or runny nose.  CARDIOVASCULAR:  No pressure, squeezing, strangling, tightness, heaviness or aching about the chest, neck, axilla or epigastrium.  RESPIRATORY:  + cough, + shortness of breath  GASTROINTESTINAL:  No nausea, vomiting or diarrhea.  GENITOURINARY:  No dysuria, frequency or urgency. No Blood in urine  MUSCULOSKELETAL:  no joint aches, no muscle pain  SKIN:  No change in skin, hair or nails.  NEUROLOGIC:  No Headaches, seizures or weakness.  PSYCHIATRIC:  No disorder of thought or mood.  ENDOCRINE:  No heat or cold intolerance  HEMATOLOGICAL:  No easy bruising or bleeding.       Physical Exam:  GEN: moderate respiratory distress   HEENT: normocephalic and atraumatic. EOMI. PERRL.  Anicteric  NECK: Supple.   LUNGS: Clear to auscultation.  HEART: Regular rate and rhythm   ABDOMEN: Soft, nontender, and nondistended.  Positive bowel sounds.    : No CVA tenderness  EXTREMITIES: Without any edema.  MSK: No joint swelling  NEUROLOGIC:  No Focal Deficits  PSYCHIATRIC: Appropriate affect .  SKIN: No Rash    Height (cm): 170.2 (12-03 @ 00:42)  Weight (kg): 104.5 (12-03 @ 00:42)  BMI (kg/m2): 36.1 (12-03 @ 00:42)  BSA (m2): 2.15 (12-03 @ 00:42)      Vitals:  T(F): 98.3 (03 Dec 2020 11:13), Max: 99.8 (02 Dec 2020 20:19)  HR: 94 (03 Dec 2020 11:13)  BP: 114/62 (03 Dec 2020 11:13)  RR: 22 (03 Dec 2020 11:13)  SpO2: 95% (03 Dec 2020 11:13) (86% - 97%)  temp max in last 48H T(F): , Max: 99.8 (12-02-20 @ 20:19)      Current Antibiotics:      Other medications:  aspirin 325 milliGRAM(s) Oral daily  dexAMETHasone  Injectable 6 milliGRAM(s) IV Push daily  dextrose 40% Gel 15 Gram(s) Oral once  dextrose 5%. 1000 milliLiter(s) IV Continuous <Continuous>  dextrose 5%. 1000 milliLiter(s) IV Continuous <Continuous>  dextrose 50% Injectable 25 Gram(s) IV Push once  dextrose 50% Injectable 12.5 Gram(s) IV Push once  dextrose 50% Injectable 25 Gram(s) IV Push once  enoxaparin Injectable 40 milliGRAM(s) SubCutaneous every 12 hours  glucagon  Injectable 1 milliGRAM(s) IntraMuscular once  insulin lispro (ADMELOG) corrective regimen sliding scale   SubCutaneous three times a day before meals  metoprolol succinate ER 50 milliGRAM(s) Oral daily  pantoprazole    Tablet 40 milliGRAM(s) Oral before breakfast  tamsulosin 0.4 milliGRAM(s) Oral at bedtime      Labs:             13.3   14.98 )-----------( 289      ( 03 Dec 2020 06:40 )             39.4      12-03    141  |  103  |  50.0<H>  ----------------------------<  166<H>  4.2   |  25.0  |  2.74<H>    Ca    8.9      03 Dec 2020 06:40    TPro  7.4  /  Alb  3.2<L>  /  TBili  0.5  /  DBili  x   /  AST  269<H>  /  ALT  271<H>  /  AlkPhos  175<H>  12-03      WBC Count: 14.98 K/uL (12-03-20 @ 06:40)  WBC Count: 14.34 K/uL (12-02-20 @ 21:03)    Creatinine, Serum: 2.74 mg/dL (12-03-20 @ 06:40)  Creatinine, Serum: 2.75 mg/dL (12-02-20 @ 21:03)    C-Reactive Protein, Serum: >42.00 mg/dL (12-02-20 @ 21:03)    Ferritin, Serum: 794 ng/mL (12-02-20 @ 21:03)    Procalcitonin, Serum: 1.61 ng/mL (12-02-20 @ 21:03)     Rapid RVP Result: NotDetec (12-02-20 @ 21:21)  SARS-CoV-2: Detected (12-02-20 @ 21:21)        < from: Xray Chest 1 View-PORTABLE IMMEDIATE (12.02.20 @ 21:48) >  EXAM:  XR CHEST PORTABLE IMMED 1V                          PROCEDURE DATE:  12/02/2020      INTERPRETATION:  TECHNIQUE: Single portable view of the chest.    COMPARISON: None.    CLINICAL HISTORY: Shortness of Breath, Cough,  Fever    FINDINGS:    Single frontal view of the chest demonstrates diffuse bilateral infiltrates. The cardiomediastinal silhouette is enlarged. No acute osseous abnormalities. Overlying EKG leads and wires are noted    IMPRESSION: Multilobar pneumonia.    < end of copied text >

## 2020-12-03 NOTE — CONSULT NOTE ADULT - ASSESSMENT
75M w/ PMHx DM, HTN, CAD s/p PCI x 1 was admitted on 12/02 w/ fatigue, fever/ chills, cough and tested +ve for COVID-19    Acute hypoxemic respiratory failure  COVID 19 +  B/L multifocal/ viral PNA  BEREKET on CKD    Remains on high flow and encourage self proning for now. Can transition to NIV if WOB continues to increase  Aim to maintain SpO2 > 88%. Repeat ABGs  Bolus 500cc over 30min and then trend lactate later today  Not a candidate for Remdesivir and convalescent plasma pending as per ID  Check renal sonogram and UA. Avoid nephrotoxic agents. Strict I&O with Cr monitoring   Contact/airborne isolation  No ICU needs at this time. Will reassess as needed    Critical Care time: 35 minutes assessing presenting problems of acute illness that poses high probability of life threatening deterioration or end organ damage/dysfunction.  Medical decision making including Initiating plan of care, reviewing data, reviewing radiology, discussing with multidisciplinary team, non inclusive of procedures

## 2020-12-03 NOTE — CONSULT NOTE ADULT - ASSESSMENT
Avoidance of nephrotoxins/nephrotoxin medication adjustment  Medication dosage adjustment for kidney function  Continuous IVF administration (guided by CVP and/or  testing of fluid responsiveness for 6 hours )  Discontinuation of ACE/ARBs ,  Close monitoring of serum Creatinine and UO  Acid-base, electrolyte and albumin status management  Avoidance of hyperglycemia ,  Consider alternatives to radiocontrast administration  Optimizing hemodynamics:    Trend U.O & Scr., Lyalfredo,    US Kidneys when able,     D/W Dr. Edmondson,

## 2020-12-04 NOTE — PROGRESS NOTE ADULT - SUBJECTIVE AND OBJECTIVE BOX
CYNDIE QUINTERO  75y  Male    Interval/overnight events/pt complaints:  patient seen for RRT at ~07:58 this AM, PA and attending at bedside  Patient desat overnight and again this AM, transitioned to BIPAP  ICU reconsulted, agreed with upgraded to stepdown and maintaining on BIPAP  Patient WOB and SOB improved after receiving 2mg morphine and switching to BP  Denies any fevers, chills, chest pain, abdominal pain, n/v/d.    MEDICATIONS  (STANDING):  acetaminophen  Suppository .. 650 milliGRAM(s) Rectal once  aspirin 325 milliGRAM(s) Oral daily  dexAMETHasone  Injectable 6 milliGRAM(s) IV Push daily  dextrose 40% Gel 15 Gram(s) Oral once  dextrose 5%. 1000 milliLiter(s) (50 mL/Hr) IV Continuous <Continuous>  dextrose 5%. 1000 milliLiter(s) (100 mL/Hr) IV Continuous <Continuous>  dextrose 50% Injectable 25 Gram(s) IV Push once  dextrose 50% Injectable 12.5 Gram(s) IV Push once  dextrose 50% Injectable 25 Gram(s) IV Push once  enoxaparin Injectable 109 milliGRAM(s) SubCutaneous every 12 hours  glucagon  Injectable 1 milliGRAM(s) IntraMuscular once  insulin lispro (ADMELOG) corrective regimen sliding scale   SubCutaneous three times a day before meals  metoprolol succinate ER 50 milliGRAM(s) Oral daily  mupirocin 2% Ointment 1 Application(s) Topical once  pantoprazole    Tablet 40 milliGRAM(s) Oral before breakfast  tamsulosin 0.4 milliGRAM(s) Oral at bedtime    MEDICATIONS  (PRN):  acetaminophen   Tablet .. 650 milliGRAM(s) Oral every 4 hours PRN Temp greater or equal to 38.5C (101.3F)  ALBUTerol    90 MICROgram(s) HFA Inhaler 2 Puff(s) Inhalation every 4 hours PRN Shortness of Breath and/or Wheezing  benzonatate 100 milliGRAM(s) Oral three times a day PRN Cough  guaifenesin/dextromethorphan  Syrup 10 milliLiter(s) Oral every 4 hours PRN Cough  LORazepam   Injectable 0.5 milliGRAM(s) IV Push once PRN Anxiety    AllergiesNo Known Allergies    Vital Signs Last 24 Hrs  T(C): 36.3 (04 Dec 2020 07:44), Max: 37.4 (04 Dec 2020 03:43)  T(F): 97.4 (04 Dec 2020 07:44), Max: 99.3 (04 Dec 2020 03:43)  HR: 105 (04 Dec 2020 09:39) (94 - 128)  BP: 123/72 (04 Dec 2020 07:44) (110/46 - 147/75)  BP(mean): --  RR: 20 (04 Dec 2020 07:44) (20 - 27)  SpO2: 90% (04 Dec 2020 09:39) (88% - 96%)  I&O's Summary    03 Dec 2020 07:01  -  04 Dec 2020 07:00  --------------------------------------------------------  IN: 2100 mL / OUT: 224 mL / NET: 1876 mL      PHYSICAL EXAM:  GENERAL: Patient impoved from mod resp distress.   HEENT - EOMI, pupils equal and reactive to light;  Moist mucous membranes, Good dentition, No lesions  NECK: Supple, No JVD, no adenopathy  CHEST/LUNG:  tacypneic,  diminished bibasilar BS; No rales, rhonchi, wheezing  HEART:  Tachycardiac; murmurs, rubs, or gallops  ABDOMEN: Soft, Nontender, Nondistended; Bowel sounds present  EXTREMITIES: R foot in ACE warp. b/l peripheral pulses. no cyanosis or edema.  NEURO:  No Focal deficits, sensory and motor intact  SKIN: Warm and dry     CAPILLARY BLOOD GLUCOSE      POCT Blood Glucose.: 160 mg/dL (03 Dec 2020 21:43)  POCT Blood Glucose.: 129 mg/dL (03 Dec 2020 16:28)  POCT Blood Glucose.: 138 mg/dL (03 Dec 2020 11:55)      LABS    (12-04 @ 09:02)                      14.1  26.42 )-----------( 376                 41.6      12-04    143  |  104  |  45.0<H>  ----------------------------<  199<H>  4.0   |  20.0<L>  |  1.50<H>    Ca    9.1      04 Dec 2020 09:02  Phos  3.4     12-04  Mg     2.6     12-04    TPro  7.9  /  Alb  2.7<L>  /  TBili  0.7  /  DBili  x   /  AST  279<H>  /  ALT  286<H>  /  AlkPhos  246<H>  12-04    ( 04 Dec 2020 06:23 )   PT: 15.7 sec;   INR: 1.37 ratio;       PTT:26.4 sec  CARDIAC MARKERS ( 04 Dec 2020 09:02 )  Trop 0.09 ng/mL<H> / CK 2775 U/L<H> / CKMB x       CARDIAC MARKERS ( 04 Dec 2020 06:23 )  Trop 0.13 ng/mL<H> / CK x     / CKMB x         CKMB Units: 4.9 ng/mL (12.04.20 @ 09:02)   Creatine Kinase, Serum: 2775 U/L (12.04.20 @ 09:02)   Lactate, Blood: 4.9: TYPE:(C=Critical, N=Notification, A=Abnormal) C   Blood Gas Hemoglobin/Hematocrit (12.04.20 @ 08:45)   Total Hemoglobin, Calculated: 13.9 g/dL   Hematocrit, Calculated: 43   Oxygen Content: 19 mL/dL   Blood Gas Profile - Arterial (12.04.20 @ 08:45)   pH, Arterial: 7.46   pCO2, Arterial: 31 mmHg   pO2, Arterial: 82 mmHg   HCO3, Arterial: 24 mmoL/L   Base Excess, Arterial: -0.5 mmol/L   Oxygen Saturation, Arterial: 96 %   FIO2, Arterial: 100%   Blood Gas Comments Arterial: ST 12/8 100%   Blood Gas Source Arterial: Arterial   IMAGING  < from: US Kidney and Bladder (12.03.20 @ 22:48) >  EXAM:  US KIDNEYS AND BLADDER                          PROCEDURE DATE:  12/03/2020          INTERPRETATION:  CLINICAL INFORMATION: Acute kidney insufficiency. Covid.    COMPARISON: CT of the lumbar spine from 9/17/2016.    TECHNIQUE: Sonography ofthe kidneys and bladder.    FINDINGS:    Right kidney: 10.9 cm. No renal mass, hydronephrosis or calculi.    Left kidney: 10.4 cm. No renal mass, hydronephrosis or calculi.    Urinary bladder: 3.9 x 4.2 x 4.5 cm intravesicular heterogeneous, slightlyhyperechoic lesion in the inferior aspect of the urinary bladder without associated internal color flow.    IMPRESSION:    1. No hydronephrosis.  2. Intravesicular heterogeneous, slightly hyperechoic lesion which favors intravesicular extension of anenlarged prostate given an enlarged prostate on the visualized portions of the urinary bladder from the CT of the lumbar spine from 9/17/2016. Differential diagnosis also includes a mass emanating from the urinary bladder.    < end of copied text >      Imaging Personally Reviewed:  [x ] YES  [ ] NO    Consultant(s) Notes Reviewed:  [x ] YES  [ ] NO  Care Discussed with Consultants/Other Providers [ x] YES  [ ] NO

## 2020-12-04 NOTE — PROGRESS NOTE ADULT - ASSESSMENT
75y  Male with h/o CAD s/p PCI x 1, MI at 59, DMII, HLD, HTN. Patient presented to the ER after progressive fatigue associated with malaise, generalized body aches and pains, chills, and non productive frequent worsening cough. He reports his tenant was sick 4 days prior to thanksgiving and ran into him when putting ut the garbage when the tenent told him he has a cough and has not been feeling well. Patient was ok up until Thanksgiving day when he started to have some symptoms. He has been quarantining with wife at home and only left for Thanksgiving dinner with his son and child. Both him and his wife went to get tested Friday for COVID and both came back positive. He was started onsteroid, nose spray, and albuterol. Due to worsening symptoms of shortness of breath he came to the ER. In the ER patient was noted to be hypoxic to 86% on RA and now on NFNC.    Acute Hypoxic respiratory failure & Multi Focal Pneumonia,   COVID-19 infection  Transaminitis   BEREKET    - COVID 19 PCR positive   - CXR reporting  multilobular pneumonia   - continue to trend inflammatory markers.   - trend CBC with diff, CMP,  CRP, Ferritin,  procalcitonin q 48hours  - Avoid antibiotics unless there is a concern for a bacterial infection  - May consider vitamin C, thiamine and zinc (note lack of evidence to support benefit with COVID 19)  - ? Remdesivir ( Due to transaminitis and BEREKET patient unable to receive Remdesivir)  - Considering  Convalescent Plasma  - On Dexamethasone 6mg Daily   Avoidance of nephrotoxins/nephrotoxin medication adjustment  Medication dosage adjustment for kidney function  Continuous IVF administration (guided by CVP and/or  testing of fluid responsiveness for 6 hours )  Discontinuation of ACE/ARBs ,  Close monitoring of serum Creatinine and UO  Acid-base, electrolyte and albumin status management  Avoidance of hyperglycemia ,  Consider alternatives to radiocontrast administration  Optimizing hemodynamics:    Trend U.O & Scr., Lytes, renal indices improving    Reviewed CXR; lasix 40mg IVP x 1 today; assess output; MICU evaluation;    Signing off; please recall if needed    D/W Dr. Edmondson

## 2020-12-04 NOTE — CONSULT NOTE ADULT - REASON FOR ADMISSION
Acute hypoxic resp distress secondary to COVID, sepsis

## 2020-12-04 NOTE — CONSULT NOTE ADULT - ASSESSMENT
Pt is a 75 y.o. M hx HLD, DM, HTN, admitted for respiratory failure secondary to COVID PNA.    Acute Hypoxic Respiratory Failure  - NIV BIPAP, satting 88-92%, no increased work of breathing  - titrate IPAP/EPAP, FiO2 and PEEP prn  - airborne/contact isolation  - recommend increased level of care to stepdown  - ABG in 4 hours and prn with clinical changes    COVID-19 PNA  - Continue Remdesivir, monitor renal function, Cr/GFR and hold prn  - Cont dexamethasone, monitor glucose, recommend ISS   - ID following  - Cont anticoagulation    Recommend stepdown level of care.  Reconsult MICU as needed.

## 2020-12-04 NOTE — CONSULT NOTE ADULT - ATTENDING COMMENTS
75M w/ PMHx DM, HTN, CAD s/p PCI x 1 was admitted on 12/02 w/ fatigue, fever/ chills, cough and tested +ve for COVID-19    Acute hypoxemic respiratory failure  COVID 19 +  B/L multifocal/ viral PNA  BEREKET on CKD  Transaminitis  Lactic acidosis    CXR today w/ progression of B/L infiltrates. No more fluids for now. Trend LA  Switched from high flow to BIPAP and pt tolerating it well with minimal WOB. Self prone if tolerating   Aim to maintain SpO2 > 88%. Repeat ABGs  Not a candidate for Remdesivir  Transfer pt to step down unit and reconsult if he further decompensates
Patient was examined.  All documentation reviewed.  Discussed pathology and treatment plan with resident.  Reviewed written documentation and agreed with the above.  Right 1st digit nail avulsion.  Patient will be follow while in house.

## 2020-12-04 NOTE — PROGRESS NOTE ADULT - ASSESSMENT
75y  Male with h/o CAD s/p PCI x 1, MI at 59, DMII, HLD, HTN. Patient presented to the ER after progressive fatigue associated with malaise, generalized body aches and pains, chills, and non productive frequent worsening cough. He reports his tenant was sick 4 days prior to thanksgiving and ran into him when putting ut the garbage when the tenent told him he has a cough and has not been feeling well. Patient was ok up until Thanksgiving day when he started to have some symptoms. He has been quarantining with wife at home and only left for Thanksgiving dinner with his son and child. Both him and his wife went to get tested Friday for COVID and both came back positive. He was started onsteroid, nose spray, and albuterol. Due to worsening symptoms of shortness of breath he came to the ER. In the ER patient was noted to be hypoxic to 86% on RA and now on NFNC.      Acute Hypoxic respiratory failure  COVID-19 infection  B/L Pneumonia   Transaminitis   cough  fevers  shortness of breath      - COVID 19 PCR positive   - CXR reporting  multilobular pneumonia   - Continue supportive care measures  - continue to trend inflammatory markers.   - trend CBC with diff, CMP,  CRP, Ferritin,  procalcitonin q 48hours  - Avoid antibiotics unless there is a concern for a bacterial infection  - May consider vitamin C, thiamine and zinc (note lack of evidence to support benefit with COVID 19)  - Discussed Remdesivir with the patient.  - Due to transaminitis and BEREKET patient unable to receive Remdesivir  - Will consent patient for Convalescent Plasma, discussed risks and AE, patient agreeable to trial Convalescent Plasma  - s/p Convalescent Plasma 12/3/20  - Dexamethasone 6mg Daily   - Patient needs to quarantine  for 14 days after discharge   - follow up all outstanding cultures  - Trend Fever  - Trend Leukocytosis    Will sign off. Please call back if any change in clinical condition.

## 2020-12-04 NOTE — RAPID RESPONSE TEAM SUMMARY - NSOTHERINTERVENTIONSRRT_GEN_ALL_CORE
CXR CXR, ABG lytes, EKG, b/l LE duplex r/o DVT, CBC, lactate, CMP    ICU consulted, seen at bedside. Do agree with upgrading to stepdown and maintaining on NIIV. patient does not require ICU level of care at this time.

## 2020-12-04 NOTE — CHART NOTE - NSCHARTNOTEFT_GEN_A_CORE
Patient currently enrolled into Hep-Covid study with Dr. Cheek  Patient needing full dose AC  Dr. Edmondson discussed with Dr. Cheek and approved PT to be D/C from Lovenox trial to transition to full dose Lovenox

## 2020-12-04 NOTE — RAPID RESPONSE TEAM SUMMARY - NSSITUATIONBACKGROUNDRRT_GEN_ALL_CORE
74 yo male with a h/o CAD s/p PCI x 1, MI at 60yo, DMII, HLD, HTN admitted with acute hypoxemic respiratory distress 2/2 Covid-19 infection. patient respiratory has worsen since admission, requiring more supplemental O2. patient was placed on Hiflo % yesterday for hypoxia, and seen by ICU, which recommending trial NIIV if worsening SOB. Patient had episode of hypoxia overnight down to 75%, which improved with proning and placing NRB over Hiflo. RRT called this AM for patient desatting to high 70s and sustaining in low 80s with increased WOB, and tachypnea. Upon arrival at bedside patient was proned, on hiflo with NRB and sustaining an O2 saturation in the mid-80s (83-85%). patient was transition to NIIV which improved O2Sat 92%, but patient became to sustain saturation 87-89%. patient to be upgrade to stepdown. pending ICU consult.

## 2020-12-04 NOTE — PROGRESS NOTE ADULT - ASSESSMENT
76 yo male with a h/o CAD s/p PCI x 1, MI at 58yo, DMII, HLD, HTN admitted with acute hypoxemic respiratory distress 2/2 Covid-19 infection. Patient was test for COVID outpatient at an  prior to admission. In the ED, patient was tachycardic, tachypneic, and satting in mid-80s on RA. Patient was placed on 5L NC which improved his sat to 95%.  The patient was started on dexamethasone in ED. Patient's O2 saturation began to decrease and placed on VM 50% overnight. Patient was hypotensive this AM, which improved after getting a fluid bolus. Patient is currently satting 90-91% on VM 50% in a prone position. Patient was placed on NRB, which improved O2Sat to 100%. Will order hiflo and monitor Sats. 12/04 patient RRT for hypoxia and Tachypnea, transitioned to BIPAP and upgraded to Stepdown.     #Acute hypoxemic respiratory distress due to interstitial pneumonia secondary to COVID-19, complicated by sepsis  - c/w O2 supplemental and maintain SpO2>92%, currently on NIV 12/8 satting 88-92%  - wean off supplemental O2 as tolerated  - c/w dexamethasone 6mg IVP daily  - albuterol HFA q 4hrs  - tessalon pearls prn  - encourage proning if tolerable   - monitor inflammatory markers q72hrs  -blood cultures pending, repeat due to RRT on 12/04.  - b/l LE duplex pending r/o DVT  - Tylenol prn fever/pain, avoid NSAIDS  - ID consulted: due to transaminitis and juan carlos patient unable to receive remdesivir, but will consider convalescent plasma.  - ICU consulted, agreed with upgrade to stepdown and maintain on BiPAP for now.    #Diabetic foot wound  - podiatry consulted- Wound care: Clean wound with saline. Apply thin layer of mupirocin cover with DSD or bandaid.   -f/u OP with Dr. chen  - XR right LE r/o fx pending   - no s/s cellulitis  - local wound care applied with wet to dry dressing in ED    #Renal Insufficiency  -- baseline Cr unknown, wife reports no prior CKD  - nephrology consulted  - close monitoring of SCr and urine output   - hold nephrotoxic meds  - hold lisinopril/HCTZ  -us kidney noted above    #DM2  -A1c 6.3  -hold metfromin for now  -c/w ISS  - monitor accu-check qAC/HS    #Transaminitis  - likely reactive in the setting of covid  - monitor LFTs   - hold ezetimibe  - hold statin  - no s/s cholelithiasis    #HTN/HLD  - c/w toprol 50mg with holding parameters  - hold statins in setting of transaminitis    #BPH  - c/w flomax 0.4 mg     CODE STATUS: FULL  GI PPX: protonix 40mg daily  DVT PPX: VCD and lovenox 40mg BID    DISPO: clinically not improving, upgraded to stepdown this AM. Patient still requiring supplemental O2, titrate as tolerated, encourage proning.         74 yo male with a h/o CAD s/p PCI x 1, MI at 60yo, DMII, HLD, HTN admitted with acute hypoxemic respiratory distress 2/2 Covid-19 infection. Patient was test for COVID outpatient at an  prior to admission. In the ED, patient was tachycardic, tachypneic, and satting in mid-80s on RA. Patient was placed on 5L NC which improved his sat to 95%.  The patient was started on dexamethasone in ED. Patient's O2 saturation began to decrease and placed on VM 50% overnight. Patient was hypotensive, which improved after getting a fluid bolus. Patient is currently satting 90-91% on VM 50% in a prone position. Patient was placed on NRB, which improved O2Sat to 100%. Will order hiflo and monitor Sats. 12/04 patient RRT for hypoxia and Tachypnea, transitioned to BIPAP and upgraded to Stepdown.     #Acute hypoxemic respiratory distress due to interstitial pneumonia secondary to COVID-19, complicated by sepsis, worsening  - c/w O2 supplemental and maintain SpO2>92%, currently on NIV 12/8 satting 88-92%  - wean off supplemental O2 as tolerated  - c/w dexamethasone 6mg IVP daily  - albuterol HFA q 4hrs  - tessalon pearls prn  - encourage proning if tolerable   - monitor inflammatory markers q72hrs  -blood cultures pending, repeat due to RRT on 12/04.  - b/l LE duplex pending r/o DVT  - Tylenol prn fever/pain, avoid NSAIDS  - ID consulted: due to transaminitis and juan carlos patient unable to receive remdesivir, but did recieve convalescent plasma on 12/3.   - ICU consulted, agreed with upgrade to stepdown and maintain on BiPAP for now. repeat ABG at noon.   - Due to worsening hypoxia and elevated Dimer will start on full dose AC for high suspicion of PE in tyhe setting of covid, will get LE duplex; Once oxygen requirments improve will get CT chest.     #Lactic acidosis due to sepsis from COVID  - Discussed with ICU cannot given more fluids due to worseon hypoxia and repeat cxr with edema vs ARDS.   - will trend  - follow blood cx    #Diabetic foot wound  - podiatry consulted- Wound care: Clean wound with saline. Apply thin layer of mupirocin cover with DSD or bandaid.   -f/u OP with Dr. chen  - XR right LE r/o fx pending   - no s/s cellulitis  - local wound care applied with wet to dry dressing in ED    #Renal Insufficiency, improved  -- baseline Cr unknown, wife reports no prior CKD  - nephrology consulted  - close monitoring of SCr and urine output   - hold nephrotoxic meds  - hold lisinopril/HCTZ  -us kidney noted above  - Discussed with Nephro will give 40mg IV lasix once and monitor uop. Strict Is and os.     #DM2  -A1c 6.3  -hold metfromin for now  -c/w ISS  - monitor accu-check qAC/HS    #Transaminitis  - likely reactive in the setting of covid  - monitor LFTs   - hold ezetimibe  - hold statin  - no s/s cholelithiasis    #HTN/HLD  - c/w toprol 50mg with holding parameters  - hold statins in setting of transaminitis    #BPH  - c/w flomax 0.4 mg     CODE STATUS: FULL  GI PPX: protonix 40mg daily  DVT PPX: VCD and lovenox theraputic    DISPO: clinically not improving, upgraded to stepdown this AM. Patient still requiring supplemental O2, titrate as tolerated, encourage proning.

## 2020-12-04 NOTE — PROGRESS NOTE ADULT - SUBJECTIVE AND OBJECTIVE BOX
[facility]  INFECTIOUS DISEASES AND INTERNAL MEDICINE at [place]  =======================================================  Anatoliy Hall MD  Diplomates American Board of Internal Medicine and Infectious Diseases  Tel: [phone]      Fax: [phone]  =======================================================    CYNDIE QUINTERO [id number]    Follow up: COVID 19     No fever or chills    Was a rapid response this morning due to worsening respiratory failure.       Allergies:  No Known Allergies      REVIEW OF SYSTEMS:  CONSTITUTIONAL:  No Fever or chills  HEENT:  No diplopia or blurred vision.  No earache, sore throat or runny nose.  CARDIOVASCULAR:  No pressure, squeezing, strangling, tightness, heaviness or aching about the chest, neck, axilla or epigastrium.  RESPIRATORY:  + cough, + shortness of breath  GASTROINTESTINAL:  No nausea, vomiting or diarrhea.  GENITOURINARY:  No dysuria, frequency or urgency. No Blood in urine  MUSCULOSKELETAL:  no joint aches, no muscle pain  SKIN:  No change in skin, hair or nails.  NEUROLOGIC:  No Headaches, seizures or weakness.  PSYCHIATRIC:  No disorder of thought or mood.  ENDOCRINE:  No heat or cold intolerance  HEMATOLOGICAL:  No easy bruising or bleeding.       Physical Exam:  GEN: moderate respiratory distress   HEENT: normocephalic and atraumatic. EOMI. PERRL.  Anicteric  NECK: Supple.   LUNGS: Clear to auscultation.  HEART: Regular rate and rhythm   ABDOMEN: Soft, nontender, and nondistended.  Positive bowel sounds.    : No CVA tenderness  EXTREMITIES: Without any edema.  MSK: No joint swelling  NEUROLOGIC:  No Focal Deficits  PSYCHIATRIC: Appropriate affect .  SKIN: No Rash      Vitals:  T(F): 99.9 (04 Dec 2020 10:11), Max: 99.9 (04 Dec 2020 10:11)  HR: 101 (04 Dec 2020 11:00)  BP: 123/65 (04 Dec 2020 11:00)  RR: 21 (04 Dec 2020 11:00)  SpO2: 93% (04 Dec 2020 11:00) (88% - 96%)  temp max in last 48H T(F): , Max: 99.9 (12-04-20 @ 10:11)      Current Antibiotics:      Other medications:  acetaminophen  Suppository .. 650 milliGRAM(s) Rectal once  aspirin 325 milliGRAM(s) Oral daily  dexAMETHasone  Injectable 6 milliGRAM(s) IV Push daily  dextrose 40% Gel 15 Gram(s) Oral once  dextrose 5%. 1000 milliLiter(s) IV Continuous <Continuous>  dextrose 5%. 1000 milliLiter(s) IV Continuous <Continuous>  dextrose 50% Injectable 25 Gram(s) IV Push once  dextrose 50% Injectable 12.5 Gram(s) IV Push once  dextrose 50% Injectable 25 Gram(s) IV Push once  enoxaparin Injectable 109 milliGRAM(s) SubCutaneous every 12 hours  furosemide   Injectable 40 milliGRAM(s) IV Push once  glucagon  Injectable 1 milliGRAM(s) IntraMuscular once  insulin lispro (ADMELOG) corrective regimen sliding scale   SubCutaneous three times a day before meals  metoprolol succinate ER 50 milliGRAM(s) Oral daily  mupirocin 2% Ointment 1 Application(s) Topical once  pantoprazole    Tablet 40 milliGRAM(s) Oral before breakfast  tamsulosin 0.4 milliGRAM(s) Oral at bedtime      Labs:             14.1   26.42 )-----------( 376      ( 04 Dec 2020 09:02 )             41.6      12-04    143  |  104  |  45.0<H>  ----------------------------<  199<H>  4.0   |  20.0<L>  |  1.50<H>    Ca    9.1      04 Dec 2020 09:02  Phos  3.4     12-04  Mg     2.6     12-04    TPro  7.9  /  Alb  2.7<L>  /  TBili  0.7  /  DBili  x   /  AST  279<H>  /  ALT  286<H>  /  AlkPhos  246<H>  12-04      WBC Count: 26.42 K/uL (12-04-20 @ 09:02)  WBC Count: 23.91 K/uL (12-04-20 @ 06:23)  WBC Count: 14.98 K/uL (12-03-20 @ 06:40)  WBC Count: 14.34 K/uL (12-02-20 @ 21:03)    Creatinine, Serum: 1.50 mg/dL (12-04-20 @ 09:02)  Creatinine, Serum: 1.54 mg/dL (12-04-20 @ 06:23)  Creatinine, Serum: 1.97 mg/dL (12-03-20 @ 16:43)  Creatinine, Serum: 2.74 mg/dL (12-03-20 @ 06:40)  Creatinine, Serum: 2.75 mg/dL (12-02-20 @ 21:03)    C-Reactive Protein, Serum: 34.98 mg/dL (12-04-20 @ 06:23)  C-Reactive Protein, Serum: >42.00 mg/dL (12-02-20 @ 21:03)    Ferritin, Serum: 1135 ng/mL (12-04-20 @ 06:23)  Ferritin, Serum: 794 ng/mL (12-02-20 @ 21:03)    Procalcitonin, Serum: 1.61 ng/mL (12-02-20 @ 21:03)     Rapid RVP Result: NotDetec (12-02-20 @ 21:21)  SARS-CoV-2: Detected (12-02-20 @ 21:21)    COVID-19 IgG Antibody Interpretation: Negative (12-03-20 @ 13:30)  COVID-19 IgG Antibody Index: 0.66 Index (12-03-20 @ 13:30)      < from: US Kidney and Bladder (12.03.20 @ 22:48) >  EXAM:  US KIDNEYS AND BLADDER                          PROCEDURE DATE:  12/03/2020          INTERPRETATION:  CLINICAL INFORMATION: Acute kidney insufficiency. Covid.    COMPARISON: CT of the lumbar spine from 9/17/2016.    TECHNIQUE: Sonography ofthe kidneys and bladder.    FINDINGS:    Right kidney: 10.9 cm. No renal mass, hydronephrosis or calculi.    Left kidney: 10.4 cm. No renal mass, hydronephrosis or calculi.    Urinary bladder: 3.9 x 4.2 x 4.5 cm intravesicular heterogeneous, slightlyhyperechoic lesion in the inferior aspect of the urinary bladder without associated internal color flow.    IMPRESSION:    1. No hydronephrosis.  2. Intravesicular heterogeneous, slightly hyperechoic lesion which favors intravesicular extension of anenlarged prostate given an enlarged prostate on the visualized portions of the urinary bladder from the CT of the lumbar spine from 9/17/2016. Differential diagnosis also includes a mass emanating from the urinary bladder.    < end of copied text >

## 2020-12-04 NOTE — CONSULT NOTE ADULT - SUBJECTIVE AND OBJECTIVE BOX
Pt is a 75 y.o. M w/ PMHx DM, HTN, CAD s/p PCI x 1 was admitted on  w/ fatigue, fever/ chills, cough and tested +ve for COVID-19. ICU consulted today for worsening SOB/ hypoxemia. MICU consulted because pt desaturated to 70's, had increased work of breathing and exhibited facial pallor. Primary team proned the patient without significant improvement and transitioned the patient to BIPAP. On steroids. Pt desires to be resuscitated at this time however has not made a final decision about his intubation status, active goals of care conversations with patient and hospitalist team daily. Pt on BIPAP and sitting upright on medicine floor during interview and physical exam.      Patient is a 75y old  Male who presents with a chief complaint of Acute hypoxic resp distress secondary to COVID, sepsis (04 Dec 2020 09:56)        PAST MEDICAL & SURGICAL HISTORY:  Heart attack    Hyperlipidemia    Hypertension    CAD (coronary artery disease)  stent     S/P cardiac cath  with MI at 59 and s/p 1 stent    S/P laminectomy with spinal fusion        Allergies    No Known Allergies    Intolerances      FAMILY HISTORY:  FH: colon cancer  in mother and father        Family history otherwise noncontributory.    Social History: ***  Review of Systems:    ALL OTHER REVIEW OF SYSTEMS EXCEPT PER HPI NEGATIVE.      Medications:    furosemide   Injectable 40 milliGRAM(s) IV Push once  metoprolol succinate ER 50 milliGRAM(s) Oral daily  tamsulosin 0.4 milliGRAM(s) Oral at bedtime    ALBUTerol    90 MICROgram(s) HFA Inhaler 2 Puff(s) Inhalation every 4 hours PRN  benzonatate 100 milliGRAM(s) Oral three times a day PRN  guaifenesin/dextromethorphan  Syrup 10 milliLiter(s) Oral every 4 hours PRN    acetaminophen   Tablet .. 650 milliGRAM(s) Oral every 4 hours PRN  acetaminophen  Suppository .. 650 milliGRAM(s) Rectal once  aspirin 325 milliGRAM(s) Oral daily  LORazepam   Injectable 0.5 milliGRAM(s) IV Push once PRN      enoxaparin Injectable 109 milliGRAM(s) SubCutaneous every 12 hours    pantoprazole    Tablet 40 milliGRAM(s) Oral before breakfast      dexAMETHasone  Injectable 6 milliGRAM(s) IV Push daily  dextrose 40% Gel 15 Gram(s) Oral once  dextrose 50% Injectable 25 Gram(s) IV Push once  dextrose 50% Injectable 12.5 Gram(s) IV Push once  dextrose 50% Injectable 25 Gram(s) IV Push once  glucagon  Injectable 1 milliGRAM(s) IntraMuscular once  insulin lispro (ADMELOG) corrective regimen sliding scale   SubCutaneous three times a day before meals    dextrose 5%. 1000 milliLiter(s) IV Continuous <Continuous>  dextrose 5%. 1000 milliLiter(s) IV Continuous <Continuous>      mupirocin 2% Ointment 1 Application(s) Topical once            ICU Vital Signs Last 24 Hrs  T(C): 37.7 (04 Dec 2020 10:11), Max: 37.7 (04 Dec 2020 10:11)  T(F): 99.9 (04 Dec 2020 10:11), Max: 99.9 (04 Dec 2020 10:11)  HR: 101 (04 Dec 2020 11:00) (101 - 128)  BP: 123/65 (04 Dec 2020 11:00) (110/46 - 147/75)  BP(mean): 77 (04 Dec 2020 11:00) (70 - 77)  ABP: --  ABP(mean): --  RR: 21 (04 Dec 2020 11:00) (20 - 27)  SpO2: 93% (04 Dec 2020 11:00) (88% - 96%)    Vital Signs Last 24 Hrs  T(C): 37.7 (04 Dec 2020 10:11), Max: 37.7 (04 Dec 2020 10:11)  T(F): 99.9 (04 Dec 2020 10:11), Max: 99.9 (04 Dec 2020 10:11)  HR: 101 (04 Dec 2020 11:00) (101 - 128)  BP: 123/65 (04 Dec 2020 11:00) (110/46 - 147/75)  BP(mean): 77 (04 Dec 2020 11:00) (70 - 77)  RR: 21 (04 Dec 2020 11:00) (20 - 27)  SpO2: 93% (04 Dec 2020 11:00) (88% - 96%)    ABG - ( 04 Dec 2020 08:45 )  pH, Arterial: 7.46  pH, Blood: x     /  pCO2: 31    /  pO2: 82    / HCO3: 24    / Base Excess: -0.5  /  SaO2: 96                  I&O's Detail    03 Dec 2020 07:01  -  04 Dec 2020 07:00  --------------------------------------------------------  IN:    Sodium Bicarbonate: 1600 mL    Sodium Chloride 0.9% Bolus: 500 mL  Total IN: 2100 mL    OUT:    Estimated Blood Loss (mL): 224 mL  Total OUT: 224 mL    Total NET: 1876 mL      04 Dec 2020 07:01  -  04 Dec 2020 11:59  --------------------------------------------------------  IN:  Total IN: 0 mL    OUT:    Oral Fluid: 0 mL    Voided (mL): 300 mL  Total OUT: 300 mL    Total NET: -300 mL            LABS:                        14.1   26.42 )-----------( 376      ( 04 Dec 2020 09:02 )             41.6     12-04    143  |  104  |  45.0<H>  ----------------------------<  199<H>  4.0   |  20.0<L>  |  1.50<H>    Ca    9.1      04 Dec 2020 09:02  Phos  3.4     12-04  Mg     2.6     12-04    TPro  7.9  /  Alb  2.7<L>  /  TBili  0.7  /  DBili  x   /  AST  279<H>  /  ALT  286<H>  /  AlkPhos  246<H>  12-04      CARDIAC MARKERS ( 04 Dec 2020 09:02 )  x     / 0.09 ng/mL / 2775 U/L / x     / 4.9 ng/mL  CARDIAC MARKERS ( 04 Dec 2020 06:23 )  x     / 0.13 ng/mL / x     / x     / x          CAPILLARY BLOOD GLUCOSE      POCT Blood Glucose.: 173 mg/dL (04 Dec 2020 11:17)    PT/INR - ( 04 Dec 2020 06:23 )   PT: 15.7 sec;   INR: 1.37 ratio         PTT - ( 04 Dec 2020 06:23 )  PTT:26.4 sec  Urinalysis Basic - ( 04 Dec 2020 00:38 )    Color: Yellow / Appearance: Slightly Turbid / S.015 / pH: x  Gluc: x / Ketone: Negative  / Bili: Negative / Urobili: Negative mg/dL   Blood: x / Protein: 100 mg/dL / Nitrite: Negative   Leuk Esterase: Negative / RBC: 3-5 /HPF / WBC 3-5   Sq Epi: x / Non Sq Epi: Few / Bacteria: Occasional      CULTURES:    Physical Examination:    GENERAL: No acute distress.      EYES: Pupils equal, reactive to light.  Symmetric.    EARS, NOSE, THROAT: Normal; supple neck, no lymphadenopathy; trachea midline    PULM: Clear to auscultation bilaterally, no significant sputum production, RR 18-20 on BIPAP machine, no use of accessory muscles, no cyanosis/pallor noted, no increased work of breathing noted, oxygen saturation b/t 88-92%    CVS: Regular rate and rhythm, no murmurs, rubs, or gallops    GI: Soft, nondistended, nontender, normoactive bowel sounds, no masses, no guarding    EXTREMITIES: No edema    SKIN: Warm and well perfused, no rashes noted.    NEURO: Alert, oriented, interactive, nonfocal    DEVICES: PIV, BIPAP vent    RADIOLOGY: reviewed    CRITICAL CARE TIME SPENT: 30 minutes.

## 2020-12-04 NOTE — CHART NOTE - NSCHARTNOTEFT_GEN_A_CORE
Repeat ABG with worsening hypoxia and patient becoming alkalotic, co2 slightly increased.   Discussed with ICU and patient has been accepted for transfer to MICU service  Daughter Sivan Updated [phone]

## 2020-12-04 NOTE — PROGRESS NOTE ADULT - SUBJECTIVE AND OBJECTIVE BOX
[facility] DIVISION OF KIDNEY DISEASES AND HYPERTENSION -- FOLLOW UP NOTE  --------------------------------------------------------------------------------  Chief Complaint:  BEREKET    24 hour events/subjective:  Pt evaluated remotely 2/2 COVID;  Discussed in detail with hospitalist;  Images/CXR reviewed with hospitalist;      PAST HISTORY  --------------------------------------------------------------------------------  No significant changes to PMH, PSH, FHx, SHx, unless otherwise noted    ALLERGIES & MEDICATIONS  --------------------------------------------------------------------------------  Allergies    No Known Allergies    Intolerances      Standing Inpatient Medications  acetaminophen  Suppository .. 650 milliGRAM(s) Rectal once  aspirin 325 milliGRAM(s) Oral daily  chlorhexidine 4% Liquid 1 Application(s) Topical <User Schedule>  dexAMETHasone  Injectable 6 milliGRAM(s) IV Push daily  dextrose 40% Gel 15 Gram(s) Oral once  dextrose 5%. 1000 milliLiter(s) IV Continuous <Continuous>  dextrose 5%. 1000 milliLiter(s) IV Continuous <Continuous>  dextrose 50% Injectable 25 Gram(s) IV Push once  dextrose 50% Injectable 12.5 Gram(s) IV Push once  dextrose 50% Injectable 25 Gram(s) IV Push once  enoxaparin Injectable 109 milliGRAM(s) SubCutaneous every 12 hours  glucagon  Injectable 1 milliGRAM(s) IntraMuscular once  insulin lispro (ADMELOG) corrective regimen sliding scale   SubCutaneous three times a day before meals  metoprolol succinate ER 50 milliGRAM(s) Oral daily  mupirocin 2% Ointment 1 Application(s) Topical once  pantoprazole    Tablet 40 milliGRAM(s) Oral before breakfast  tamsulosin 0.4 milliGRAM(s) Oral at bedtime    PRN Inpatient Medications  acetaminophen   Tablet .. 650 milliGRAM(s) Oral every 4 hours PRN  ALBUTerol    90 MICROgram(s) HFA Inhaler 2 Puff(s) Inhalation every 4 hours PRN  benzonatate 100 milliGRAM(s) Oral three times a day PRN  guaifenesin/dextromethorphan  Syrup 10 milliLiter(s) Oral every 4 hours PRN  LORazepam   Injectable 0.5 milliGRAM(s) IV Push once PRN      REVIEW OF SYSTEMS  --------------------------------------------------------------------------------  Unable to obtain 2/2 COVID    VITALS/PHYSICAL EXAM  --------------------------------------------------------------------------------  T(C): 37.7 (12-04-20 @ 10:11), Max: 37.7 (12-04-20 @ 10:11)  HR: 107 (12-04-20 @ 13:00) (101 - 128)  BP: 136/71 (12-04-20 @ 13:00) (110/46 - 147/75)  RR: 23 (12-04-20 @ 13:00) (20 - 27)  SpO2: 96% (12-04-20 @ 13:00) (88% - 96%)  Wt(kg): --  Height (cm): 170.2 (12-03-20 @ 16:45)  Weight (kg): 109.7 (12-03-20 @ 16:45)  BMI (kg/m2): 37.9 (12-03-20 @ 16:45)  BSA (m2): 2.19 (12-03-20 @ 16:45)      12-03-20 @ 07:01  -  12-04-20 @ 07:00  --------------------------------------------------------  IN: 2100 mL / OUT: 224 mL / NET: 1876 mL    12-04-20 @ 07:01  -  12-04-20 @ 13:22  --------------------------------------------------------  IN: 0 mL / OUT: 430 mL / NET: -430 mL    Physical Exam:    Reference Recent Physical Exam:  · In accordance with current standards limiting patient contact please refer to the recent:	Inpatient Physical Exam  Physical Exam Per Critical Care:  GENERAL: No acute distress.    EYES: Pupils equal, reactive to light.  Symmetric.  EARS, NOSE, THROAT: Normal; supple neck, no lymphadenopathy; trachea midline  PULM: Clear to auscultation bilaterally, no significant sputum production, RR 18-20 on BIPAP machine, no use of accessory muscles, no cyanosis/pallor noted, no increased work of breathing noted, oxygen saturation b/t 88-92%  CVS: Regular rate and rhythm, no murmurs, rubs, or gallops  GI: Soft, nondistended, nontender, normoactive bowel sounds, no masses, no guarding  EXTREMITIES: No edema  SKIN: Warm and well perfused, no rashes noted.  NEURO: Alert, oriented, interactive, nonfocal  DEVICES: PIV, BIPAP vent    LABS/STUDIES  --------------------------------------------------------------------------------              14.1   26.42 >-----------<  376      [12-04-20 @ 09:02]              41.6     143  |  104  |  45.0  ----------------------------<  199      [12-04-20 @ 09:02]  4.0   |  20.0  |  1.50        Ca     9.1     [12-04-20 @ 09:02]      Mg     2.6     [12-04-20 @ 06:23]      Phos  3.4     [12-04-20 @ 06:23]    TPro  7.9  /  Alb  2.7  /  TBili  0.7  /  DBili  x   /  AST  279  /  ALT  286  /  AlkPhos  246  [12-04-20 @ 09:02]    PT/INR: PT 15.7 , INR 1.37       [12-04-20 @ 06:23]  PTT: 26.4       [12-04-20 @ 06:23]    Troponin 0.09      [12-04-20 @ 09:02]  CK 2775      [12-04-20 @ 09:02]        [12-04-20 @ 06:23]    Creatinine Trend:  SCr 1.50 [12-04 @ 09:02]  SCr 1.54 [12-04 @ 06:23]  SCr 1.97 [12-03 @ 16:43]  SCr 2.74 [12-03 @ 06:40]  SCr 2.75 [12-02 @ 21:03]    Urinalysis - [12-04-20 @ 00:38]      Color Yellow / Appearance Slightly Turbid / SG 1.015 / pH 5.0      Gluc Negative / Ketone Negative  / Bili Negative / Urobili Negative       Blood Large / Protein 100 / Leuk Est Negative / Nitrite Negative      RBC 3-5 / WBC 3-5 / Hyaline  / Gran  / Sq Epi  / Non Sq Epi Few / Bacteria Occasional    Urine Sodium 80      [12-04-20 @ 00:40]  Urine Osmolality 623      [12-04-20 @ 00:38]    Ferritin 1135      [12-04-20 @ 06:23]    HCV 0.06, Nonreact      [12-03-20 @ 11:51]

## 2020-12-05 NOTE — DIETITIAN INITIAL EVALUATION ADULT. - ORAL NUTRITION SUPPLEMENTS
add Glucerna TID to maximize nutrition status and provide an additional 220 kcal and 10g protein per serving.

## 2020-12-05 NOTE — DIETITIAN INITIAL EVALUATION ADULT. - PERTINENT MEDS FT
MEDICATIONS  (STANDING):  acetaminophen  Suppository .. 650 milliGRAM(s) Rectal once  aspirin 325 milliGRAM(s) Oral daily  chlorhexidine 4% Liquid 1 Application(s) Topical <User Schedule>  dexAMETHasone  IVPB 20 milliGRAM(s) IV Intermittent daily  dexMEDEtomidine Infusion 0.3 MICROgram(s)/kG/Hr (8.23 mL/Hr) IV Continuous <Continuous>  dextrose 40% Gel 15 Gram(s) Oral once  dextrose 5%. 1000 milliLiter(s) (50 mL/Hr) IV Continuous <Continuous>  dextrose 5%. 1000 milliLiter(s) (100 mL/Hr) IV Continuous <Continuous>  dextrose 50% Injectable 25 Gram(s) IV Push once  dextrose 50% Injectable 12.5 Gram(s) IV Push once  dextrose 50% Injectable 25 Gram(s) IV Push once  enoxaparin Study Injectable () 1 Dose(s) SubCutaneous two times a day  glucagon  Injectable 1 milliGRAM(s) IntraMuscular once  insulin lispro (ADMELOG) corrective regimen sliding scale   SubCutaneous three times a day before meals  metoprolol succinate ER 50 milliGRAM(s) Oral daily  mupirocin 2% Ointment 1 Application(s) Topical once  pantoprazole    Tablet 40 milliGRAM(s) Oral before breakfast  tamsulosin 0.4 milliGRAM(s) Oral at bedtime    MEDICATIONS  (PRN):  acetaminophen   Tablet .. 650 milliGRAM(s) Oral every 4 hours PRN Temp greater or equal to 38.5C (101.3F)  ALBUTerol    90 MICROgram(s) HFA Inhaler 2 Puff(s) Inhalation every 4 hours PRN Shortness of Breath and/or Wheezing  benzonatate 100 milliGRAM(s) Oral three times a day PRN Cough  guaifenesin/dextromethorphan  Syrup 10 milliLiter(s) Oral every 4 hours PRN Cough  LORazepam   Injectable 0.5 milliGRAM(s) IV Push once PRN Anxiety

## 2020-12-05 NOTE — PROGRESS NOTE ADULT - ATTENDING COMMENTS
76 yo male with hx of DM, HTN, CAD s/p PCI, admitted 12/2 with hypoxemic respiratory failure due to COVID19 viral pneumonia, now bipap dependent.  Prognosis guarded, high risk of requiring intubation.
Patient seen and examined during RRT. He is awake and alert but tachypneic desats to 80s.   NIPPV started.     PHYSICAL EXAM:  Constitutional: No acute distress, alert and oriented by 3  HEENT: AT/NC, EOMI, PERRLA, Normal conjunctiva, no pharyngeal erythema, moist oral mucosa  Respiratory: CTA BL, equal breath sounds, no crackles or wheezing, on NIPPV, tachypneic  Cardiovascular: RRR.   Gastrointestinal: soft, Non-tender, Non-distended + Bowel sounds, no rebound or guarding  Musculoskeletal: No joint edema  Neurological: CN 2-12 grossly intact, no focal deficits  Skin: warm, dry and intact  Psychiatric: normal mood and affect    Discussed in detail with JON Quinn and agree with above, Changes made to text.  Discussed with ICU Dr. Franco, keep on NIPPV and repeat abg. No fluids. Transfer to Step-down. and will monitor closely. Patient taken off lovenox study ans start fulldose ac for high concern for PE/DVT.   Discussed with Dtr Sivan [phone].    90 minutes spent on Critical care time.
Patient seen and examined this morning. Patient desaurated while trying to get up to use the bathroom. PLaced patient on NRB. Called resp for high flow. Oxygenation improved. Reports he is short if breath    PHYSICAL EXAM:  Constitutional: No acute distress, alert and oriented by 3, obese  HEENT: AT/NC, EOMI, PERRLA, Normal conjunctiva, no pharyngeal erythema, moist oral mucosa  Respiratory: CTA BL, equal breath sounds, no crackles or wheezing, NRB  Cardiovascular: RRR, no edema  Gastrointestinal: soft, Non-tender, Non-distended + Bowel sounds, no rebound or guarding  Musculoskeletal: No joint edema  Neurological: CN 2-12 grossly intact, no focal deficits  Skin: warm, dry and intact  Psychiatric: normal mood and affect    Discussed with JON Quinn and agree with above  Will get ABG and consult Nephrology.   Wife Tam lewis

## 2020-12-05 NOTE — DIETITIAN INITIAL EVALUATION ADULT. - PERTINENT LABORATORY DATA
12-05 Na147 mmol/L<H> Glu 203 mg/dL<H> K+ 4.1 mmol/L Cr  1.65 mg/dL<H> BUN 55.0 mg/dL<H> Phos 3.7 mg/dL Alb 2.8 g/dL<L> PAB n/a HgA1c 6.3%

## 2020-12-05 NOTE — DIETITIAN INITIAL EVALUATION ADULT. - OTHER INFO
75 year old male with a PMH of DM, HTN, CAD s/p PCI x 1 was admitted on 12/02 w/ fatigue, fever/ chills, cough and tested positive for COVID-19. Pt now upgraded to ICU for worsening SOB/hypoxemia. Pt on COVID isolation precautions. CCHO, DASH/TLC diet ordered, however, NPO due to BiPAP. Noted with 1+ b/l leg edema. Skin intact. Last BM 12/1 per documentation. RD to follow up.

## 2020-12-05 NOTE — PROGRESS NOTE ADULT - SUBJECTIVE AND OBJECTIVE BOX
Patient is a 74 y/o M who presents with a chief complaint of Acute hypoxic respiratory failure 2/2 COVID, sepsis (05 Dec 2020 13:59)    BRIEF HOSPITAL COURSE: ***    Events last 24 hours: ***    PAST MEDICAL & SURGICAL HISTORY:  Heart attack  Hyperlipidemia  Hypertension  CAD (coronary artery disease)  stent   S/P cardiac cath  with MI at 59 and s/p 1 stent  S/P laminectomy with spinal fusion      Review of Systems:  Unable to obtain. On BiPAP.    Medications:  metoprolol succinate ER 50 milliGRAM(s) Oral daily  tamsulosin 0.4 milliGRAM(s) Oral at bedtime  ALBUTerol    90 MICROgram(s) HFA Inhaler 2 Puff(s) Inhalation every 4 hours PRN  benzonatate 100 milliGRAM(s) Oral three times a day PRN  guaifenesin/dextromethorphan  Syrup 10 milliLiter(s) Oral every 4 hours PRN  acetaminophen   Tablet .. 650 milliGRAM(s) Oral every 4 hours PRN  aspirin 325 milliGRAM(s) Oral daily  dexMEDEtomidine Infusion 0.3 MICROgram(s)/kG/Hr IV Continuous <Continuous>  LORazepam   Injectable 0.5 milliGRAM(s) IV Push once PRN  pantoprazole    Tablet 40 milliGRAM(s) Oral before breakfast  dexAMETHasone  IVPB 20 milliGRAM(s) IV Intermittent daily  dextrose 40% Gel 15 Gram(s) Oral once  dextrose 50% Injectable 25 Gram(s) IV Push once  dextrose 50% Injectable 12.5 Gram(s) IV Push once  dextrose 50% Injectable 25 Gram(s) IV Push once  glucagon  Injectable 1 milliGRAM(s) IntraMuscular once  insulin lispro (ADMELOG) corrective regimen sliding scale   SubCutaneous three times a day before meals  dextrose 5%. 1000 milliLiter(s) IV Continuous <Continuous>  dextrose 5%. 1000 milliLiter(s) IV Continuous <Continuous>  chlorhexidine 4% Liquid 1 Application(s) Topical <User Schedule>  mupirocin 2% Ointment 1 Application(s) Topical once  enoxaparin Study Injectable () 1 Dose(s) SubCutaneous two times a day    ICU Vital Signs Last 24 Hrs  T(C): 37.4 (05 Dec 2020 12:00), Max: 37.7 (04 Dec 2020 16:36)  T(F): 99.3 (05 Dec 2020 12:00), Max: 99.8 (04 Dec 2020 16:36)  HR: 84 (05 Dec 2020 15:00) (78 - 115)  BP: 114/68 (05 Dec 2020 14:00) (95/41 - 148/76)  BP(mean): 80 (05 Dec 2020 14:00) (52 - 91)  ABP: --  ABP(mean): --  RR: 32 (05 Dec 2020 15:00) (19 - 34)  SpO2: 93% (05 Dec 2020 15:00) (91% - 97%)    ABG - ( 05 Dec 2020 05:10 )  pH, Arterial: 7.49  pH, Blood: x     /  pCO2: 40    /  pO2: 72    / HCO3: 30    / Base Excess: 6.4   /  SaO2: 95        I&O's Detail    04 Dec 2020 07:01  -  05 Dec 2020 07:00  --------------------------------------------------------  IN:    Dexmedetomidine: 82 mL    IV PiggyBack: 50 mL  Total IN: 132 mL    OUT:    Oral Fluid: 0 mL    Voided (mL): 2125 mL  Total OUT: 2125 mL    Total NET: -1993 mL      05 Dec 2020 07:01  -  05 Dec 2020 15:38  --------------------------------------------------------  IN:    Dexmedetomidine: 31.1 mL  Total IN: 31.1 mL    OUT:  Total OUT: 0 mL    Total NET: 31.1 mL    LABS:                        13.8   20.40 )-----------( 350      ( 05 Dec 2020 04:39 )             40.2     12-05    147<H>  |  106  |  55.0<H>  ----------------------------<  203<H>  4.1   |  25.0  |  1.65<H>    Ca    8.9      05 Dec 2020 04:39  Phos  3.7     12-05  Mg     2.8     12-05    TPro  7.3  /  Alb  2.8<L>  /  TBili  0.7  /  DBili  x   /  AST  203<H>  /  ALT  235<H>  /  AlkPhos  214<H>  12    CARDIAC MARKERS ( 05 Dec 2020 04:39 )  x     / x     / 1534 U/L / x     / 1.7 ng/mL  CARDIAC MARKERS ( 04 Dec 2020 09:02 )  x     / 0.09 ng/mL / 2775 U/L / x     / 4.9 ng/mL  CARDIAC MARKERS ( 04 Dec 2020 06:23 )  x     / 0.13 ng/mL / x     / x     / x        CAPILLARY BLOOD GLUCOSE    POCT Blood Glucose.: 184 mg/dL (05 Dec 2020 11:26)    PT/INR - ( 04 Dec 2020 06:23 )   PT: 15.7 sec;   INR: 1.37 ratio    PTT - ( 04 Dec 2020 06:23 )  PTT:26.4 sec    Urinalysis Basic - ( 04 Dec 2020 00:38 )  Color: Yellow / Appearance: Slightly Turbid / S.015 / pH: x  Gluc: x / Ketone: Negative  / Bili: Negative / Urobili: Negative mg/dL   Blood: x / Protein: 100 mg/dL / Nitrite: Negative   Leuk Esterase: Negative / RBC: 3-5 /HPF / WBC 3-5   Sq Epi: x / Non Sq Epi: Few / Bacteria: Occasional    CULTURES:  Culture Results:   No growth at 48 hours ( @ 06:39)  Culture Results:   No growth at 48 hours ( @ 06:39)  Rapid RVP Result: NotDetec ( @ 21:21)    RADIOLOGY:   < from: Xray Chest 1 View- PORTABLE-Urgent (Xray Chest 1 View- PORTABLE-Urgent .) (20 @ 09:28) >  EXAM:  XR CHEST PORTABLE URGENT 1V                          PROCEDURE DATE:  2020      INTERPRETATION:  Clinical history: Covid pneumonia    Worsening airspace disease seen bilaterally since . No obvious effusions. No pneumothorax.    Impression: Worsening airspace disease.    EVERTON CHAN MD; Attending Radiologist  This document has been electronically signed. Dec  4 2020  2:25PM    < end of copied text >    CRITICAL CARE TIME SPENT: 36 minutes   Patient is a 76 y/o M who presents with a chief complaint of Acute hypoxic respiratory failure 2/2 COVID, sepsis (05 Dec 2020 13:59)    BRIEF HOSPITAL COURSE: 76 y/o M w/ a PMHx of DM, HTN, and CAD s/p PCI x 1 who was admitted on  w/ acute hypoxic respiratory failure 2/2 COVID-19 PNA. Upgraded to MICU level of care on  in the setting of worsening hypoxia and increased work of breathing. Transitioned to BiPAP at that time. Course complicated by BEREKET, transaminitis, and lactic acidosis.    Events last 24 hours: Attempted to transition to HFNC today, but immediately desaturated to 70s. On BiPAP (14/8, FiO2 85%). Precedex running at 0.05 to assist w/ work of breathing.    PAST MEDICAL & SURGICAL HISTORY:  Heart attack  Hyperlipidemia  Hypertension  CAD (coronary artery disease)  stent   S/P cardiac cath  with MI at 59 and s/p 1 stent  S/P laminectomy with spinal fusion      Review of Systems:  Unable to obtain. On BiPAP.    Medications:  metoprolol succinate ER 50 milliGRAM(s) Oral daily  tamsulosin 0.4 milliGRAM(s) Oral at bedtime  ALBUTerol    90 MICROgram(s) HFA Inhaler 2 Puff(s) Inhalation every 4 hours PRN  benzonatate 100 milliGRAM(s) Oral three times a day PRN  guaifenesin/dextromethorphan  Syrup 10 milliLiter(s) Oral every 4 hours PRN  acetaminophen   Tablet .. 650 milliGRAM(s) Oral every 4 hours PRN  aspirin 325 milliGRAM(s) Oral daily  dexMEDEtomidine Infusion 0.3 MICROgram(s)/kG/Hr IV Continuous <Continuous>  LORazepam   Injectable 0.5 milliGRAM(s) IV Push once PRN  pantoprazole    Tablet 40 milliGRAM(s) Oral before breakfast  dexAMETHasone  IVPB 20 milliGRAM(s) IV Intermittent daily  dextrose 40% Gel 15 Gram(s) Oral once  dextrose 50% Injectable 25 Gram(s) IV Push once  dextrose 50% Injectable 12.5 Gram(s) IV Push once  dextrose 50% Injectable 25 Gram(s) IV Push once  glucagon  Injectable 1 milliGRAM(s) IntraMuscular once  insulin lispro (ADMELOG) corrective regimen sliding scale   SubCutaneous three times a day before meals  dextrose 5%. 1000 milliLiter(s) IV Continuous <Continuous>  dextrose 5%. 1000 milliLiter(s) IV Continuous <Continuous>  chlorhexidine 4% Liquid 1 Application(s) Topical <User Schedule>  mupirocin 2% Ointment 1 Application(s) Topical once  enoxaparin Study Injectable () 1 Dose(s) SubCutaneous two times a day    ICU Vital Signs Last 24 Hrs  T(C): 37.4 (05 Dec 2020 12:00), Max: 37.7 (04 Dec 2020 16:36)  T(F): 99.3 (05 Dec 2020 12:00), Max: 99.8 (04 Dec 2020 16:36)  HR: 84 (05 Dec 2020 15:00) (78 - 115)  BP: 114/68 (05 Dec 2020 14:00) (95/41 - 148/76)  BP(mean): 80 (05 Dec 2020 14:00) (52 - 91)  ABP: --  ABP(mean): --  RR: 32 (05 Dec 2020 15:00) (19 - 34)  SpO2: 93% (05 Dec 2020 15:00) (91% - 97%)    ABG - ( 05 Dec 2020 05:10 )  pH, Arterial: 7.49  pH, Blood: x     /  pCO2: 40    /  pO2: 72    / HCO3: 30    / Base Excess: 6.4   /  SaO2: 95        I&O's Detail    04 Dec 2020 07:01  -  05 Dec 2020 07:00  --------------------------------------------------------  IN:    Dexmedetomidine: 82 mL    IV PiggyBack: 50 mL  Total IN: 132 mL    OUT:    Oral Fluid: 0 mL    Voided (mL): 2125 mL  Total OUT: 2125 mL    Total NET: -1993 mL      05 Dec 2020 07:01  -  05 Dec 2020 15:38  --------------------------------------------------------  IN:    Dexmedetomidine: 31.1 mL  Total IN: 31.1 mL    OUT:  Total OUT: 0 mL    Total NET: 31.1 mL    LABS:                        13.8   20.40 )-----------( 350      ( 05 Dec 2020 04:39 )             40.2         147<H>  |  106  |  55.0<H>  ----------------------------<  203<H>  4.1   |  25.0  |  1.65<H>    Ca    8.9      05 Dec 2020 04:39  Phos  3.7       Mg     2.8         TPro  7.3  /  Alb  2.8<L>  /  TBili  0.7  /  DBili  x   /  AST  203<H>  /  ALT  235<H>  /  AlkPhos  214<H>  12    CARDIAC MARKERS ( 05 Dec 2020 04:39 )  x     / x     / 1534 U/L / x     / 1.7 ng/mL  CARDIAC MARKERS ( 04 Dec 2020 09:02 )  x     / 0.09 ng/mL / 2775 U/L / x     / 4.9 ng/mL  CARDIAC MARKERS ( 04 Dec 2020 06:23 )  x     / 0.13 ng/mL / x     / x     / x        CAPILLARY BLOOD GLUCOSE    POCT Blood Glucose.: 184 mg/dL (05 Dec 2020 11:26)    PT/INR - ( 04 Dec 2020 06:23 )   PT: 15.7 sec;   INR: 1.37 ratio    PTT - ( 04 Dec 2020 06:23 )  PTT:26.4 sec    Urinalysis Basic - ( 04 Dec 2020 00:38 )  Color: Yellow / Appearance: Slightly Turbid / S.015 / pH: x  Gluc: x / Ketone: Negative  / Bili: Negative / Urobili: Negative mg/dL   Blood: x / Protein: 100 mg/dL / Nitrite: Negative   Leuk Esterase: Negative / RBC: 3-5 /HPF / WBC 3-5   Sq Epi: x / Non Sq Epi: Few / Bacteria: Occasional    CULTURES:  Culture Results:   No growth at 48 hours ( @ 06:39)  Culture Results:   No growth at 48 hours ( @ 06:39)  Rapid RVP Result: NotDetec ( @ 21:21)    RADIOLOGY:   < from: Xray Chest 1 View- PORTABLE-Urgent (Xray Chest 1 View- PORTABLE-Urgent .) (20 @ 09:28) >  EXAM:  XR CHEST PORTABLE URGENT 1V                          PROCEDURE DATE:  2020      INTERPRETATION:  Clinical history: Covid pneumonia    Worsening airspace disease seen bilaterally since . No obvious effusions. No pneumothorax.    Impression: Worsening airspace disease.    EVERTON CHAN MD; Attending Radiologist  This document has been electronically signed. Dec  4 2020  2:25PM    < end of copied text >    CRITICAL CARE TIME SPENT: 36 minutes

## 2020-12-05 NOTE — PROGRESS NOTE ADULT - ASSESSMENT
76 y/o M w/ a PMHx of DM, HTN, and CAD s/p PCI x 1 admitted w/:    1. Acute hypoxic respiratory failure  2. ARDS  3. COVID-19 PNA  4. BEREKET  5. Transaminitis  6. Lactic acidosis, cleared    PLAN:    Dispo: Spoke w/ pt's daughter Ethel Nagy ([phone]). I expressed that although we have been able to titrate down pt's FiO2 requirement, he remains critically ill and at high risk for intubation. All questions answered. Re-addressed goals of care, will remain full code.    Case discussed w/ ICU attending, Dr. Quijano. 76 y/o M w/ a PMHx of DM, HTN, and CAD s/p PCI x 1 admitted w/:    1. Acute hypoxic respiratory failure  2. ARDS  3. COVID-19 PNA  4. BEREKET  5. Transaminitis  6. Lactic acidosis, cleared    PLAN:  Neuro: Precedex to assist w/ increased work of breathing. May also utilize morphine PRN.  Cardiac: Maintain MAP > 65. Has been unable to take metoprolol PO, but heart rate and BP have been controlled.  Pulm: BiPAP dependent (14/8, FiO2 85%). Actively titrating FiO2 to maintain SpO2 > 88%. At extremely high risk for respiratory decompensation requiring intubation. Continue w/ decadron 20mg daily.  GI: NPO while on BiPAP. Trend LFTs.  Renal: Monitor renal indices. Avoid nephrotoxic agents and renally dose meds.  ID: Trend inflammatory markers q2-3 days. Not a candidate for remdesivir in the setting of BEREKET.  Endo: Aggressive glycemic control to limit FS glucose to < 180 mg/dl.  Heme: Chemical DVT prophylaxis w/ Lovenox and mechanical DVT prophylaxis w/ SCDs.    Dispo: Spoke w/ pt's daughter Ethel Nagy ([phone]). I expressed that although we have been able to titrate down pt's FiO2 requirement, he remains critically ill and at high risk for intubation. All questions answered. Re-addressed goals of care, will remain full code.    Case discussed w/ ICU attending, Dr. Quijano.

## 2020-12-06 NOTE — PROGRESS NOTE ADULT - ASSESSMENT
74 y/o M w/ a PMHx of DM, HTN, and CAD s/p PCI x 1 admitted w/:    1. Acute hypoxic respiratory failure  2. ARDS  3. COVID-19 PNA  4. BEREKET  5. Transaminitis  6. Hypernatremia    PLAN:  Neuro: Precedex to assist w/ increased work of breathing. May also utilize morphine PRN.  Cardiac: Maintain MAP > 65. Has been unable to take metoprolol PO, but heart rate and BP have been controlled.  Pulm: BiPAP dependent (14/8, FiO2 75%). Actively titrating FiO2 to maintain SpO2 > 88%. At extremely high risk for respiratory decompensation requiring intubation. Continue w/ decadron 20mg daily.  GI: NPO while on BiPAP. Trend LFTs.  Renal: Monitor renal indices. Avoid nephrotoxic agents and renally dose meds. Now hypernatremic (Na 154), will start D5 at 50cc/hr.  ID: Trend inflammatory markers q2-3 days. Not a candidate for remdesivir in the setting of BEREKET/transaminitis.  Endo: Aggressive glycemic control to limit FS glucose to < 180 mg/dl. Will add Lantus 10u at bedtime.   Heme: Chemical DVT prophylaxis w/ Lovenox and mechanical DVT prophylaxis w/ SCDs.    Dispo: Spoke w/ pt's son River Nagy, will relay update to pt's daughter Ethel. All questions answered.    Case discussed w/ ICU attending, Dr. Quijano.

## 2020-12-06 NOTE — PROGRESS NOTE ADULT - SUBJECTIVE AND OBJECTIVE BOX
Patient is a 76 y/o male who presents with a chief complaint of acute hypoxic respiratory failure 2/2 COVID, sepsis (05 Dec 2020 13:59)    BRIEF HOSPITAL COURSE: ***    Events last 24 hours: ***    PAST MEDICAL & SURGICAL HISTORY:  Heart attack  Hyperlipidemia  Hypertension  CAD (coronary artery disease)  stent 2010  S/P cardiac cath  with MI at 59 and s/p 1 stent  S/P laminectomy with spinal fusion  2014    Review of Systems:  Unable to obtain. On BiPAP.    Medications:  metoprolol succinate ER 50 milliGRAM(s) Oral daily  tamsulosin 0.4 milliGRAM(s) Oral at bedtime  ALBUTerol    90 MICROgram(s) HFA Inhaler 2 Puff(s) Inhalation every 4 hours PRN  benzonatate 100 milliGRAM(s) Oral three times a day PRN  guaifenesin/dextromethorphan  Syrup 10 milliLiter(s) Oral every 4 hours PRN  acetaminophen   Tablet .. 650 milliGRAM(s) Oral every 4 hours PRN  aspirin 325 milliGRAM(s) Oral daily  dexMEDEtomidine Infusion 0.3 MICROgram(s)/kG/Hr IV Continuous <Continuous>  LORazepam   Injectable 0.5 milliGRAM(s) IV Push once PRN  pantoprazole    Tablet 40 milliGRAM(s) Oral before breakfast  dexAMETHasone  IVPB 20 milliGRAM(s) IV Intermittent daily  dextrose 40% Gel 15 Gram(s) Oral once  dextrose 50% Injectable 25 Gram(s) IV Push once  dextrose 50% Injectable 12.5 Gram(s) IV Push once  dextrose 50% Injectable 25 Gram(s) IV Push once  glucagon  Injectable 1 milliGRAM(s) IntraMuscular once  insulin lispro (ADMELOG) corrective regimen sliding scale   SubCutaneous three times a day before meals  dextrose 5%. 1000 milliLiter(s) IV Continuous <Continuous>  dextrose 5%. 1000 milliLiter(s) IV Continuous <Continuous>  chlorhexidine 4% Liquid 1 Application(s) Topical <User Schedule>  mupirocin 2% Ointment 1 Application(s) Topical once  enoxaparin Study Injectable () 1 Dose(s) SubCutaneous two times a day    ICU Vital Signs Last 24 Hrs  T(C): 37.6 (05 Dec 2020 20:00), Max: 37.8 (05 Dec 2020 16:18)  T(F): 99.6 (05 Dec 2020 20:00), Max: 100.1 (05 Dec 2020 16:18)  HR: 88 (06 Dec 2020 07:00) (78 - 97)  BP: 139/66 (06 Dec 2020 07:00) (101/62 - 139/66)  BP(mean): 84 (06 Dec 2020 07:00) (62 - 84)  ABP: --  ABP(mean): --  RR: 22 (06 Dec 2020 07:00) (20 - 32)  SpO2: 91% (06 Dec 2020 07:00) (91% - 98%)    ABG - ( 05 Dec 2020 05:10 )  pH, Arterial: 7.49  pH, Blood: x     /  pCO2: 40    /  pO2: 72    / HCO3: 30    / Base Excess: 6.4   /  SaO2: 95        I&O's Detail    05 Dec 2020 07:01  -  06 Dec 2020 07:00  --------------------------------------------------------  IN:    Dexmedetomidine: 63.2 mL    IV PiggyBack: 50 mL  Total IN: 113.2 mL    OUT:    Incontinent per Condom Catheter (mL): 350 mL  Total OUT: 350 mL    Total NET: -236.8 mL    LABS:                        14.3   21.59 )-----------( 418      ( 06 Dec 2020 03:38 )             42.7     12-06    154<H>  |  111<H>  |  78.0<H>  ----------------------------<  189<H>  4.2   |  27.0  |  1.99<H>    Ca    8.9      06 Dec 2020 03:38  Phos  5.1     12-06  Mg     3.4     12-06    TPro  7.6  /  Alb  3.0<L>  /  TBili  1.2  /  DBili  x   /  AST  278<H>  /  ALT  288<H>  /  AlkPhos  208<H>  12-06    CARDIAC MARKERS ( 05 Dec 2020 04:39 )  x     / x     / 1534 U/L / x     / 1.7 ng/mL  CARDIAC MARKERS ( 04 Dec 2020 09:02 )  x     / 0.09 ng/mL / 2775 U/L / x     / 4.9 ng/mL    CAPILLARY BLOOD GLUCOSE    POCT Blood Glucose.: 184 mg/dL (06 Dec 2020 07:22)    CULTURES:  Culture Results:   No growth at 48 hours (12-03 @ 06:39)  Culture Results:   No growth at 48 hours (12-03 @ 06:39)  Rapid RVP Result: NotDetec (12-02 @ 21:21)    RADIOLOGY: ***    CRITICAL CARE TIME SPENT: ***   Patient is a 74 y/o male who presents with a chief complaint of acute hypoxic respiratory failure 2/2 COVID, sepsis (05 Dec 2020 13:59)    BRIEF HOSPITAL COURSE: 74 y/o M w/ a PMHx of DM, HTN, and CAD s/p PCI x 1 who was admitted on 12/2 w/ acute hypoxic respiratory failure 2/2 COVID-19 PNA. Upgraded to MICU level of care on 12/4 in the setting of worsening hypoxia and increased work of breathing. Transitioned to BiPAP at that time. Course complicated by BEREKET, transaminitis, and lactic acidosis.    Events last 24 hours: Remains BiPAP dependent (14/8, FiO2 75%), slightly tachypneic (RR low 30s). Precedex running at 0.15 to assist w/ work of breathing. Arousable, follows commands, unable to speak more than one word.    PAST MEDICAL & SURGICAL HISTORY:  Heart attack  Hyperlipidemia  Hypertension  CAD (coronary artery disease)  stent 2010  S/P cardiac cath  with MI at 59 and s/p 1 stent  S/P laminectomy with spinal fusion  2014    Review of Systems:  Unable to obtain. On BiPAP.    Medications:  metoprolol succinate ER 50 milliGRAM(s) Oral daily  tamsulosin 0.4 milliGRAM(s) Oral at bedtime  ALBUTerol    90 MICROgram(s) HFA Inhaler 2 Puff(s) Inhalation every 4 hours PRN  benzonatate 100 milliGRAM(s) Oral three times a day PRN  guaifenesin/dextromethorphan  Syrup 10 milliLiter(s) Oral every 4 hours PRN  acetaminophen   Tablet .. 650 milliGRAM(s) Oral every 4 hours PRN  aspirin 325 milliGRAM(s) Oral daily  dexMEDEtomidine Infusion 0.3 MICROgram(s)/kG/Hr IV Continuous <Continuous>  LORazepam   Injectable 0.5 milliGRAM(s) IV Push once PRN  pantoprazole    Tablet 40 milliGRAM(s) Oral before breakfast  dexAMETHasone  IVPB 20 milliGRAM(s) IV Intermittent daily  dextrose 40% Gel 15 Gram(s) Oral once  dextrose 50% Injectable 25 Gram(s) IV Push once  dextrose 50% Injectable 12.5 Gram(s) IV Push once  dextrose 50% Injectable 25 Gram(s) IV Push once  glucagon  Injectable 1 milliGRAM(s) IntraMuscular once  insulin lispro (ADMELOG) corrective regimen sliding scale   SubCutaneous three times a day before meals  dextrose 5%. 1000 milliLiter(s) IV Continuous <Continuous>  dextrose 5%. 1000 milliLiter(s) IV Continuous <Continuous>  chlorhexidine 4% Liquid 1 Application(s) Topical <User Schedule>  mupirocin 2% Ointment 1 Application(s) Topical once  enoxaparin Study Injectable () 1 Dose(s) SubCutaneous two times a day    ICU Vital Signs Last 24 Hrs  T(C): 37.6 (05 Dec 2020 20:00), Max: 37.8 (05 Dec 2020 16:18)  T(F): 99.6 (05 Dec 2020 20:00), Max: 100.1 (05 Dec 2020 16:18)  HR: 88 (06 Dec 2020 07:00) (78 - 97)  BP: 139/66 (06 Dec 2020 07:00) (101/62 - 139/66)  BP(mean): 84 (06 Dec 2020 07:00) (62 - 84)  ABP: --  ABP(mean): --  RR: 22 (06 Dec 2020 07:00) (20 - 32)  SpO2: 91% (06 Dec 2020 07:00) (91% - 98%)    ABG - ( 05 Dec 2020 05:10 )  pH, Arterial: 7.49  pH, Blood: x     /  pCO2: 40    /  pO2: 72    / HCO3: 30    / Base Excess: 6.4   /  SaO2: 95        I&O's Detail    05 Dec 2020 07:01  -  06 Dec 2020 07:00  --------------------------------------------------------  IN:    Dexmedetomidine: 63.2 mL    IV PiggyBack: 50 mL  Total IN: 113.2 mL    OUT:    Incontinent per Condom Catheter (mL): 350 mL  Total OUT: 350 mL    Total NET: -236.8 mL    LABS:                        14.3   21.59 )-----------( 418      ( 06 Dec 2020 03:38 )             42.7     12-06    154<H>  |  111<H>  |  78.0<H>  ----------------------------<  189<H>  4.2   |  27.0  |  1.99<H>    Ca    8.9      06 Dec 2020 03:38  Phos  5.1     12-06  Mg     3.4     12-06    TPro  7.6  /  Alb  3.0<L>  /  TBili  1.2  /  DBili  x   /  AST  278<H>  /  ALT  288<H>  /  AlkPhos  208<H>  12-06    CARDIAC MARKERS ( 05 Dec 2020 04:39 )  x     / x     / 1534 U/L / x     / 1.7 ng/mL  CARDIAC MARKERS ( 04 Dec 2020 09:02 )  x     / 0.09 ng/mL / 2775 U/L / x     / 4.9 ng/mL    CAPILLARY BLOOD GLUCOSE    POCT Blood Glucose.: 184 mg/dL (06 Dec 2020 07:22)    CULTURES:  Culture Results:   No growth at 48 hours (12-03 @ 06:39)  Culture Results:   No growth at 48 hours (12-03 @ 06:39)  Rapid RVP Result: NotDetec (12-02 @ 21:21)    RADIOLOGY:  < from: Xray Chest 1 View- PORTABLE-Urgent (Xray Chest 1 View- PORTABLE-Urgent .) (12.04.20 @ 09:28) >     EXAM:  XR CHEST PORTABLE URGENT 1V                          PROCEDURE DATE:  12/04/2020      INTERPRETATION:  Clinical history: Covid pneumonia    Worsening airspace disease seen bilaterally since December 2. No obvious effusions. No pneumothorax.    Impression: Worsening airspace disease.    EVETRON CHAN MD; Attending Radiologist  This document has been electronically signed. Dec  4 2020  2:25PM    < end of copied text >    CRITICAL CARE TIME SPENT: 39 minutes   Patient is a 76 y/o male who presents with a chief complaint of acute hypoxic respiratory failure 2/2 COVID, sepsis (05 Dec 2020 13:59)    BRIEF HOSPITAL COURSE: 76 y/o M w/ a PMHx of DM, HTN, and CAD s/p PCI x 1 who was admitted on 12/2 w/ acute hypoxic respiratory failure 2/2 COVID-19 PNA. Upgraded to MICU level of care on 12/4 in the setting of worsening hypoxia and increased work of breathing. Transitioned to BiPAP at that time. Course complicated by BEREKET, transaminitis, and lactic acidosis.    Events last 24 hours: Remains BiPAP dependent (14/8, FiO2 75%), slightly tachypneic (RR low 30s). Precedex running at 0.15 to assist w/ work of breathing. Arousable, follows commands, unable to speak more than one word.    PAST MEDICAL & SURGICAL HISTORY:  Heart attack   Hyperlipidemia  Hypertension  CAD (coronary artery disease)  stent 2010  S/P cardiac cath  with MI at 59 and s/p 1 stent  S/P laminectomy with spinal fusion  2014    Review of Systems:  Unable to obtain. On BiPAP.    Medications:  metoprolol succinate ER 50 milliGRAM(s) Oral daily  tamsulosin 0.4 milliGRAM(s) Oral at bedtime  ALBUTerol    90 MICROgram(s) HFA Inhaler 2 Puff(s) Inhalation every 4 hours PRN  benzonatate 100 milliGRAM(s) Oral three times a day PRN  guaifenesin/dextromethorphan  Syrup 10 milliLiter(s) Oral every 4 hours PRN  acetaminophen   Tablet .. 650 milliGRAM(s) Oral every 4 hours PRN  aspirin 325 milliGRAM(s) Oral daily  dexMEDEtomidine Infusion 0.3 MICROgram(s)/kG/Hr IV Continuous <Continuous>  LORazepam   Injectable 0.5 milliGRAM(s) IV Push once PRN  pantoprazole    Tablet 40 milliGRAM(s) Oral before breakfast  dexAMETHasone  IVPB 20 milliGRAM(s) IV Intermittent daily  dextrose 40% Gel 15 Gram(s) Oral once  dextrose 50% Injectable 25 Gram(s) IV Push once  dextrose 50% Injectable 12.5 Gram(s) IV Push once  dextrose 50% Injectable 25 Gram(s) IV Push once  glucagon  Injectable 1 milliGRAM(s) IntraMuscular once  insulin lispro (ADMELOG) corrective regimen sliding scale   SubCutaneous three times a day before meals  dextrose 5%. 1000 milliLiter(s) IV Continuous <Continuous>  dextrose 5%. 1000 milliLiter(s) IV Continuous <Continuous>  chlorhexidine 4% Liquid 1 Application(s) Topical <User Schedule>  mupirocin 2% Ointment 1 Application(s) Topical once  enoxaparin Study Injectable () 1 Dose(s) SubCutaneous two times a day    ICU Vital Signs Last 24 Hrs  T(C): 37.6 (05 Dec 2020 20:00), Max: 37.8 (05 Dec 2020 16:18)  T(F): 99.6 (05 Dec 2020 20:00), Max: 100.1 (05 Dec 2020 16:18)  HR: 88 (06 Dec 2020 07:00) (78 - 97)  BP: 139/66 (06 Dec 2020 07:00) (101/62 - 139/66)  BP(mean): 84 (06 Dec 2020 07:00) (62 - 84)  ABP: --  ABP(mean): --  RR: 22 (06 Dec 2020 07:00) (20 - 32)  SpO2: 91% (06 Dec 2020 07:00) (91% - 98%)    ABG - ( 05 Dec 2020 05:10 )  pH, Arterial: 7.49  pH, Blood: x     /  pCO2: 40    /  pO2: 72    / HCO3: 30    / Base Excess: 6.4   /  SaO2: 95        I&O's Detail    05 Dec 2020 07:01  -  06 Dec 2020 07:00  --------------------------------------------------------  IN:    Dexmedetomidine: 63.2 mL    IV PiggyBack: 50 mL  Total IN: 113.2 mL    OUT:    Incontinent per Condom Catheter (mL): 350 mL  Total OUT: 350 mL    Total NET: -236.8 mL    LABS:                        14.3   21.59 )-----------( 418      ( 06 Dec 2020 03:38 )             42.7     12-06    154<H>  |  111<H>  |  78.0<H>  ----------------------------<  189<H>  4.2   |  27.0  |  1.99<H>    Ca    8.9      06 Dec 2020 03:38  Phos  5.1     12-06  Mg     3.4     12-06    TPro  7.6  /  Alb  3.0<L>  /  TBili  1.2  /  DBili  x   /  AST  278<H>  /  ALT  288<H>  /  AlkPhos  208<H>  12-06    CARDIAC MARKERS ( 05 Dec 2020 04:39 )  x     / x     / 1534 U/L / x     / 1.7 ng/mL  CARDIAC MARKERS ( 04 Dec 2020 09:02 )  x     / 0.09 ng/mL / 2775 U/L / x     / 4.9 ng/mL    CAPILLARY BLOOD GLUCOSE    POCT Blood Glucose.: 184 mg/dL (06 Dec 2020 07:22)    CULTURES:  Culture Results:   No growth at 48 hours (12-03 @ 06:39)  Culture Results:   No growth at 48 hours (12-03 @ 06:39)  Rapid RVP Result: NotDetec (12-02 @ 21:21)    RADIOLOGY:  < from: Xray Chest 1 View- PORTABLE-Urgent (Xray Chest 1 View- PORTABLE-Urgent .) (12.04.20 @ 09:28) >     EXAM:  XR CHEST PORTABLE URGENT 1V                          PROCEDURE DATE:  12/04/2020      INTERPRETATION:  Clinical history: Covid pneumonia    Worsening airspace disease seen bilaterally since December 2. No obvious effusions. No pneumothorax.    Impression: Worsening airspace disease.    EVERTON CHAN MD; Attending Radiologist  This document has been electronically signed. Dec  4 2020  2:25PM    < end of copied text >    CRITICAL CARE TIME SPENT: 39 minutes

## 2020-12-07 NOTE — PROVIDER CONTACT NOTE (CRITICAL VALUE NOTIFICATION) - ACTION/TREATMENT ORDERED:
No orders given at this time
No new orders at this time. Continue to monitor pt.
JON Mitchell made aware of above critical value. No new orders at this time.
No new orders at this time. Will continue to monitor pt.

## 2020-12-07 NOTE — PROGRESS NOTE ADULT - SUBJECTIVE AND OBJECTIVE BOX
[facility] DIVISION OF KIDNEY DISEASES AND HYPERTENSION -- FOLLOW UP NOTE  --------------------------------------------------------------------------------  Chief Complaint: derrick vs Derrick on CKD    24 hour events/subjective:  nephrology reconsulted for derrick  pt remains on BIpap        PAST HISTORY  --------------------------------------------------------------------------------  No significant changes to PMH, PSH, FHx, SHx, unless otherwise noted    ALLERGIES & MEDICATIONS  --------------------------------------------------------------------------------  Allergies    No Known Allergies    Intolerances      Standing Inpatient Medications  aspirin Suppository 300 milliGRAM(s) Rectal daily  chlorhexidine 4% Liquid 1 Application(s) Topical <User Schedule>  dexAMETHasone  IVPB 20 milliGRAM(s) IV Intermittent daily  dexMEDEtomidine Infusion 0.3 MICROgram(s)/kG/Hr IV Continuous <Continuous>  dextrose 5%. 1000 milliLiter(s) IV Continuous <Continuous>  enoxaparin Study Injectable () 1 Dose(s) SubCutaneous two times a day  glucagon  Injectable 1 milliGRAM(s) IntraMuscular once  insulin glargine Injectable (LANTUS) 15 Unit(s) SubCutaneous two times a day  insulin lispro (ADMELOG) corrective regimen sliding scale   SubCutaneous three times a day before meals  metoprolol succinate ER 50 milliGRAM(s) Oral daily  piperacillin/tazobactam IVPB.. 3.375 Gram(s) IV Intermittent every 12 hours  tamsulosin 0.4 milliGRAM(s) Oral at bedtime    PRN Inpatient Medications  acetaminophen  Suppository .. 650 milliGRAM(s) Rectal every 6 hours PRN  ALBUTerol    90 MICROgram(s) HFA Inhaler 2 Puff(s) Inhalation every 4 hours PRN  benzonatate 100 milliGRAM(s) Oral three times a day PRN  guaifenesin/dextromethorphan  Syrup 10 milliLiter(s) Oral every 4 hours PRN  LORazepam   Injectable 0.5 milliGRAM(s) IV Push once PRN      REVIEW OF SYSTEMS  UNABLE TO OBTAIN      VITALS/PHYSICAL EXAM  --------------------------------------------------------------------------------  T(C): 38.1 (12-07-20 @ 13:00), Max: 38.5 (12-06-20 @ 20:00)  HR: 107 (12-07-20 @ 13:00) (98 - 120)  BP: 104/64 (12-07-20 @ 13:00) (93/62 - 133/59)  RR: 21 (12-07-20 @ 13:00) (16 - 33)  SpO2: 94% (12-07-20 @ 13:00) (91% - 96%)  Wt(kg): --        12-06-20 @ 07:01  -  12-07-20 @ 07:00  --------------------------------------------------------  IN: 423.4 mL / OUT: 950 mL / NET: -526.6 mL    12-07-20 @ 07:01  -  12-07-20 @ 14:16  --------------------------------------------------------  IN: 750 mL / OUT: 400 mL / NET: 350 mL      Physical Exam:  	Gen: awake  	HEENT:  on bipap  	Pulm: coarse breath sounds  	CV: RRR, S1S2; no rub  	Back: No spinal or CVA tenderness  	Abd: +BS, soft, nontender/nondistended  	UE: Warm,no edema  	LE: Warm, no edema  	Neuro: No focal deficits  	Psych: calm  	Skin: Warms    LABS/STUDIES  --------------------------------------------------------------------------------              14.8   21.13 >-----------<  450      [12-07-20 @ 05:52]              45.1     156  |  114  |  100.0  ----------------------------<  227      [12-07-20 @ 05:52]  4.6   |  25.0  |  2.27        Ca     9.1     [12-07-20 @ 05:52]      Mg     3.6     [12-07-20 @ 05:52]      Phos  6.0     [12-07-20 @ 05:52]    TPro  7.6  /  Alb  3.0  /  TBili  1.2  /  DBili  x   /  AST  278  /  ALT  288  /  AlkPhos  208  [12-06-20 @ 03:38]    PT/INR: PT 20.1 , INR 1.78       [12-07-20 @ 05:53]  PTT: 55.5       [12-07-20 @ 05:53]      Creatinine Trend:  SCr 2.27 [12-07 @ 05:52]  SCr 1.99 [12-06 @ 03:38]  SCr 1.65 [12-05 @ 04:39]  SCr 1.50 [12-04 @ 09:02]  SCr 1.54 [12-04 @ 06:23]    Urinalysis - [12-06-20 @ 23:42]      Color Yellow / Appearance Slightly Turbid / SG 1.015 / pH 5.0      Gluc Negative / Ketone Negative  / Bili Negative / Urobili 4       Blood Moderate / Protein 100 / Leuk Est Trace / Nitrite Negative      RBC 3-5 / WBC 3-5 / Hyaline  / Gran  / Sq Epi  / Non Sq Epi Few / Bacteria Few    Urine Sodium 80      [12-04-20 @ 00:40]  Urine Osmolality 623      [12-04-20 @ 00:38]    Ferritin 1400      [12-05-20 @ 04:39]    HCV 0.06, Nonreact      [12-03-20 @ 11:51]

## 2020-12-07 NOTE — PROGRESS NOTE ADULT - ASSESSMENT
Derrick vs derrick on ckd  Acute Hypoxic respiratory failure & Multi Focal Pneumonia  COVID-19 infection  Transaminitis     Unknown  baseline kidney function  Renal US with normal appearing kidneys, ? bladder mass    Scr worsening 1.54 on admission--> 2.2  Worsening Hypernatremia and Uremia (can be partly explained by steroids use)  Pt has been NPO, on Bipap and febrile- likely dehydrated  Recommend Iv hydration (1 L NS followed by 0.45 NS @ 125 ml/hr)  and trend renal indices  Bladder scan    discussed with MICU PA

## 2020-12-07 NOTE — PROGRESS NOTE ADULT - SUBJECTIVE AND OBJECTIVE BOX
Patient is a 75y old  Male who presents with a chief complaint of Acute hypoxic respiratory failure 2/2 COVID-19, sepsis (06 Dec 2020 08:58)      BRIEF HOSPITAL COURSE:   Patient is a 75 year old male with a pmhx of DM, HTN, and CAD s/p PCI x 1 who was admitted on 12/2 w/ acute hypoxic respiratory failure 2/2 COVID-19 PNA. Upgraded to MICU level of care on 12/4 in the setting of worsening hypoxia and increased work of breathing. Transitioned to BiPAP and has been bipap dependent since that time. Course complicated by BEREKET, transaminitis, lactic acidosis and now febrile    Events last 24 hours:   - BiPAP dependent on 14/8  - febrile 101.3  - blood cxs sent  - started on zosyn  - unable to place vieira at bedside     PAST MEDICAL & SURGICAL HISTORY:  Heart attack    Hyperlipidemia    Hypertension    CAD (coronary artery disease)  stent 2010    S/P cardiac cath  with MI at 59 and s/p 1 stent    S/P laminectomy with spinal fusion  2014          Hosp day #5d    Bipap day 3        Vital signs / Reviewed and Physical Exam Performed where pertinent and urgently required    Lab / Radiology  studies / ABG / Meds -  reviewed and interpreted into the assessment and treatment plan.      Assessment/Plan/Therapeutic interventions    Diagnosis:  1. Acute hypoxic resp failure   2. Covid 19  3. Viral PNA  4. Hypernatremia   5. ARF  6. Transaminitis     Neuro - Requiring precedex and prn morphine for severe anxiety. Would titrate to patient comfort. This will also assist with reducing his work of breathing     CV -  Unable to take PO lopressor. If needed can order IV lopressor but will defer as BP is borderline at this time. Monitor for BB w/d     Pulm - Bipap dependent x 3 days on settings 14/8 fi02 85%. Sats are holding above 90% however he has been persistently tachypneic. Attempt to improve with precedex and morphine. IF there is no improvement in 1-2 days he will likely need intubation. Patient is high risk for decompensation.      GI -  Has been unable to tolerate PO 2/2 NIPPV use    Renal - Not a candidate for remdesivir 2/2 arf. Strict I/Os, renally dose meds . Even to negative fluid balance as tolerated by hemodynamics and renal fx.      Heme - Currently on lovenox study DVT PPx as stated in addition to SCD's    ID - Given persistently high wbc count, fever to 101.3 will send blood cxs, UA and start patient on zosyn for possible secondary infection. ABX discontinuation based on discussion with ID in conjunction with clinical features, culture data, and judicious procalcitonin monitoring.      Endo -  Aggressive glycemic control to limit FS glucose to < 180mg/dl.      COVID 19 specific considerations and therapeutic  options based on the available and rapidly changing literature    Goals of care considerations:  Ongoing assessment for patient specific treatment options based on progression or decline.  I have involved the family with updates and requests in guidance for medical decision making.          38  Minutes of critical care team spent in the management of this critically ill COVID-19 Patient / PUI patient with continuous assessments and interventions based on the interpretation of multiple databases.   Patient is a 75y old  Male who presents with a chief complaint of Acute hypoxic respiratory failure 2/2 COVID-19, sepsis (06 Dec 2020 08:58)      BRIEF HOSPITAL COURSE:   Patient is a 75 year old male with a pmhx of DM, HTN, and CAD s/p PCI x 1 who was admitted on 12/2 w/ acute hypoxic respiratory failure 2/2 COVID-19 PNA. Upgraded to MICU level of care on 12/4 in the setting of worsening hypoxia and increased work of breathing. Transitioned to BiPAP and has been bipap dependent since that time. Course complicated by BEREKET, transaminitis, lactic acidosis and now febrile    Events last 24 hours:   - BiPAP dependent on 14/8  - febrile 101.3  - blood cxs sent  - started on zosyn  - unable to place vieira at bedside     PAST MEDICAL & SURGICAL HISTORY:  Heart attack    Hyperlipidemia    Hypertension    CAD (coronary artery disease)  stent 2010    S/P cardiac cath  with MI at 59 and s/p 1 stent    S/P laminectomy with spinal fusion  2014          Hosp day #5d    Bipap day 3        Vital signs / Reviewed and Physical Exam Performed where pertinent and urgently required    Lab / Radiology  studies / ABG / Meds -  reviewed and interpreted into the assessment and treatment plan.      Assessment/Plan/Therapeutic interventions    Diagnosis:  1. Acute hypoxic resp failure   2. Covid 19  3. Viral PNA  4. Hypernatremia   5. ARF  6. Transaminitis     Neuro - Requiring precedex and prn morphine for severe anxiety. Would titrate to patient comfort. This will also assist with reducing his work of breathing     CV -  Unable to take PO lopressor. If needed can order IV lopressor but will defer as BP is borderline at this time. Monitor for BB w/d     Pulm - Bipap dependent x 3 days on settings 14/8 fi02 85%. Sats are holding above 90% however he has been persistently tachypneic. Attempt to improve with precedex and morphine. IF there is no improvement in 1-2 days he will likely need intubation. Patient is high risk for decompensation.      GI -  Has been unable to tolerate PO 2/2 NIPPV use    Renal - Not a candidate for remdesivir 2/2 arf. Strict I/Os, renally dose meds . Even to negative fluid balance as tolerated by hemodynamics and renal fx.  Started on d5 for hypernatremia     Heme - Currently on lovenox study. cont with SCD's.    ID - Given persistently high wbc count, fever to 101.3f will send blood cxs, UA and start patient on zosyn for possible secondary infection. ABX discontinuation based on discussion with ID in conjunction with clinical features, culture data, and judicious procalcitonin monitoring.      Endo -  Aggressive glycemic control to limit FS glucose to < 180mg/dl. lantus 10 units HS, ISS q6. Cont 20mg decadron IV daily      COVID 19 specific considerations and therapeutic  options based on the available and rapidly changing literature    Goals of care considerations:  Ongoing assessment for patient specific treatment options based on progression or decline.  I have involved the family with updates and requests in guidance for medical decision making.          39  Minutes of critical care team spent in the management of this critically ill COVID-19 Patient / PUI patient with continuous assessments and interventions based on the interpretation of multiple databases.   Patient is a 75y old  Male who presents with a chief complaint of Acute hypoxic respiratory failure 2/2 COVID-19, sepsis (06 Dec 2020 08:58)      BRIEF HOSPITAL COURSE:   Patient is a 75 year old male with a pmhx of DM, HTN, and CAD s/p PCI x 1 who was admitted on 12/2 w/ acute hypoxic respiratory failure 2/2 COVID-19 PNA. Upgraded to MICU level of care on 12/4 in the setting of worsening hypoxia and increased work of breathing. Transitioned to BiPAP and has been bipap dependent since that time. Course complicated by BEREKET, transaminitis, lactic acidosis and now febrile    Events last 24 hours:   - BiPAP dependent on 14/8  - febrile 101.3  - blood cxs sent  - started on zosyn  - unable to place vieira at bedside     PAST MEDICAL & SURGICAL HISTORY:  Heart attack    Hyperlipidemia    Hypertension    CAD (coronary artery disease)  stent 2010    S/P cardiac cath  with MI at 59 and s/p 1 stent    S/P laminectomy with spinal fusion  2014          Hosp day #5d    Bipap day 3        Vital signs / Reviewed and Physical Exam Performed where pertinent and urgently required    Lab / Radiology  studies / ABG / Meds -  reviewed and interpreted into the assessment and treatment plan.      Assessment/Plan/Therapeutic interventions    Diagnosis:  1. Acute hypoxic resp failure   2. Covid 19  3. Viral PNA  4. Hypernatremia   5. ARF  6. Transaminitis     Neuro - Requiring precedex and prn morphine for severe anxiety. Would titrate to patient comfort. This will also assist with reducing his work of breathing     CV -  Unable to take PO lopressor. If needed can order IV lopressor but will defer as BP is borderline at this time. Monitor for BB w/d     Pulm - Bipap dependent x 3 days on settings 14/8 fi02 75%. Sats are holding above 90% however he has been persistently tachypneic. Attempt to improve with precedex and morphine. IF there is no improvement in 1-2 days he will likely need intubation. Patient is high risk for decompensation.      GI -  Has been unable to tolerate PO 2/2 NIPPV use    Renal - Not a candidate for remdesivir 2/2 arf. Strict I/Os, renally dose meds . Even to negative fluid balance as tolerated by hemodynamics and renal fx.  Started on d5 for hypernatremia     Heme - Currently on lovenox study. cont with SCD's.    ID - Given persistently high wbc count, fever to 101.3f will send blood cxs, UA and start patient on zosyn for possible secondary infection. ABX discontinuation based on discussion with ID in conjunction with clinical features, culture data, and judicious procalcitonin monitoring.      Endo -  Aggressive glycemic control to limit FS glucose to < 180mg/dl. lantus 10 units HS, ISS q6. Cont 20mg decadron IV daily      COVID 19 specific considerations and therapeutic  options based on the available and rapidly changing literature    Goals of care considerations:  Ongoing assessment for patient specific treatment options based on progression or decline.  I have involved the family with updates and requests in guidance for medical decision making.          39  Minutes of critical care team spent in the management of this critically ill COVID-19 Patient / PUI patient with continuous assessments and interventions based on the interpretation of multiple databases.

## 2020-12-08 NOTE — PROGRESS NOTE ADULT - SUBJECTIVE AND OBJECTIVE BOX
[facility] DIVISION OF KIDNEY DISEASES AND HYPERTENSION -- FOLLOW UP NOTE  --------------------------------------------------------------------------------  Chief Complaint: juan carlos    24 hour events/subjective:  on high flow NC        PAST HISTORY  --------------------------------------------------------------------------------  No significant changes to PMH, PSH, FHx, SHx, unless otherwise noted    ALLERGIES & MEDICATIONS  --------------------------------------------------------------------------------  Allergies    No Known Allergies    Intolerances      Standing Inpatient Medications  acetaminophen  Suppository .. 975 milliGRAM(s) Rectal once  aspirin Suppository 300 milliGRAM(s) Rectal daily  budesonide 160 MICROgram(s)/formoterol 4.5 MICROgram(s) Inhaler 2 Puff(s) Inhalation two times a day  chlorhexidine 4% Liquid 1 Application(s) Topical <User Schedule>  dexAMETHasone  IVPB 20 milliGRAM(s) IV Intermittent daily  dexMEDEtomidine Infusion 0.3 MICROgram(s)/kG/Hr IV Continuous <Continuous>  enoxaparin Study Injectable () 1 Dose(s) SubCutaneous two times a day  famotidine Injectable 20 milliGRAM(s) IV Push daily  glucagon  Injectable 1 milliGRAM(s) IntraMuscular once  insulin glargine Injectable (LANTUS) 15 Unit(s) SubCutaneous two times a day  insulin lispro (ADMELOG) corrective regimen sliding scale   SubCutaneous three times a day before meals  piperacillin/tazobactam IVPB.. 3.375 Gram(s) IV Intermittent every 12 hours  sodium chloride 0.45%. 1000 milliLiter(s) IV Continuous <Continuous>    PRN Inpatient Medications  acetaminophen  Suppository .. 650 milliGRAM(s) Rectal every 6 hours PRN  ALBUTerol    90 MICROgram(s) HFA Inhaler 2 Puff(s) Inhalation every 4 hours PRN  benzonatate 100 milliGRAM(s) Oral three times a day PRN  guaifenesin/dextromethorphan  Syrup 10 milliLiter(s) Oral every 4 hours PRN  LORazepam   Injectable 0.5 milliGRAM(s) IV Push once PRN      REVIEW OF SYSTEMS  --------------------------------------------------------------------------------  unable to obtain    VITALS/PHYSICAL EXAM  --------------------------------------------------------------------------------  T(C): 36.9 (12-08-20 @ 12:00), Max: 37.8 (12-07-20 @ 18:00)  HR: 94 (12-08-20 @ 14:00) (90 - 129)  BP: 109/55 (12-08-20 @ 14:00) (90/70 - 118/66)  RR: 21 (12-08-20 @ 12:00) (16 - 27)  SpO2: 94% (12-08-20 @ 14:00) (88% - 98%)  Wt(kg): --        12-07-20 @ 07:01  -  12-08-20 @ 07:00  --------------------------------------------------------  IN: 3898.4 mL / OUT: 900 mL / NET: 2998.4 mL    12-08-20 @ 07:01  -  12-08-20 @ 14:56  --------------------------------------------------------  IN: 551 mL / OUT: 0 mL / NET: 551 mL      Physical Exam:  	Due to the nature of the pt's isolation status, no bedside physical exam done to limit spread of infection. Objective data was reviewed in detail.    LABS/STUDIES  --------------------------------------------------------------------------------              14.9   27.59 >-----------<  409      [12-08-20 @ 04:47]              45.9     148  |  112  |  155.0  ----------------------------<  203      [12-08-20 @ 04:47]  4.2   |  20.0  |  3.38        Ca     8.2     [12-08-20 @ 04:47]      Mg     3.4     [12-08-20 @ 04:47]      Phos  7.1     [12-08-20 @ 04:47]    TPro  7.0  /  Alb  2.8  /  TBili  0.9  /  DBili  x   /  AST  44  /  ALT  239  /  AlkPhos  163  [12-08-20 @ 04:47]    PT/INR: PT 20.1 , INR 1.78       [12-07-20 @ 05:53]  PTT: 55.5       [12-07-20 @ 05:53]    Troponin 0.01      [12-07-20 @ 18:51]    Creatinine Trend:  SCr 3.38 [12-08 @ 04:47]  SCr 2.27 [12-07 @ 05:52]  SCr 1.99 [12-06 @ 03:38]  SCr 1.65 [12-05 @ 04:39]  SCr 1.50 [12-04 @ 09:02]    Urinalysis - [12-06-20 @ 23:42]      Color Yellow / Appearance Slightly Turbid / SG 1.015 / pH 5.0      Gluc Negative / Ketone Negative  / Bili Negative / Urobili 4       Blood Moderate / Protein 100 / Leuk Est Trace / Nitrite Negative      RBC 3-5 / WBC 3-5 / Hyaline  / Gran  / Sq Epi  / Non Sq Epi Few / Bacteria Few    Urine Sodium 80      [12-04-20 @ 00:40]  Urine Osmolality 623      [12-04-20 @ 00:38]    Ferritin 1400      [12-05-20 @ 04:39]    HCV 0.06, Nonreact      [12-03-20 @ 11:51]

## 2020-12-08 NOTE — PROGRESS NOTE ADULT - SUBJECTIVE AND OBJECTIVE BOX
HPI: 76 yo male, PMHx HTN, T2DM, CAD s/p PCI x 1, admitted with COVID-19 viral pneumonia, with acute hypoxemic respiratory failure, ARDS, upgraded to MICU on 12/4 due to worsening respiratory failure BiPAP depedent, with course complicated by worsening ATN, lactic acidosis, and fevers concerning for superimposed bacterial pneumonia.        Hosp day #7  p:F ratio = 132      BiPAP 14/8 FiO2 75%  Continues to require Precedex gtt for BiPAP compliance  BEREKET worsening   Afebrile        Vital signs reviewed and physical exam performed where pertinent and urgently required.    Lab/radiology studies/ABG/meds reviewed and interpreted into the assessment and treatment plan.        Assessment/Plan/Therapeutic interventions:    Neuro: Precedex gtt for compliance with NIV     CV: IV fluid resuscitation. Monitoring end points of perfusion.    Pulm: Remains with tenuous respiratory status, BiPAP dependent. Mentation is in tact, will trial HFNC/NRB. Encourage OOB to chair as tolerated to optimize respiratory status. Add Advair. Remains high risk for necessitating intubation, d/w daughter yesterday 12/7 reconfirmed patient is a full code and is to be intubated should it be required.    GI: NPO while on BiPAP, hopeful patient may tolerate period off NIV long enough to eat today. Dehydrated, continue IVF in interim.     Renal: BEREKET continues to worsen despite IV fluids, possible ATN. BUN climbing, at risk for seizures. Patient needs vieira for accurate I&Os however RN has been unable to pass vieira; will re-attempt otherwise may need urology consult for placement. Discussed with Nephrology, if renal indices continue to worsen may benefit from dialysis/CRRT    Heme: Enrolled in Lovenox study    ID: Judicious use of antibiotics based on biomarkers and culture data, trending fever curve. High risk for HAP and VAP. On Zosyn empirically, cultures neg thus far, defervesced, to complete 7 day course. High dose steroids for COVID-19, no Remdesivir due to worsening renal function.    Endo: Aggressive glycemic control to keep FS glucose to <180mg/dl while critically ill.           COVID 19 specific considerations and therapeutic options based on the available and rapidly changing literature.    47 minutes of critical care time spent in the management of this critically ill COVID-19 patient suffering from multisystem organ failure with continuous assessments and interventions based on the interpretation of multiple databases. Critical care time not inclusive of independent time spent on procedures performed.

## 2020-12-08 NOTE — PROGRESS NOTE ADULT - ASSESSMENT
Derrick vs derrick on ckd  Acute Hypoxic respiratory failure & Multi Focal Pneumonia  COVID-19 infection  Transaminitis     Unknown  baseline kidney function  Renal US with normal appearing kidneys, ? bladder mass    Scr worsening 1.54 on admission--> 2.2--> 3.38  s/p IV hydration with no improvement  ? ATN    obtain Bladder scan, vieira   trend Scr, lytes, UOP  RRT if renal indices continue to worsen

## 2020-12-09 NOTE — PROGRESS NOTE ADULT - SUBJECTIVE AND OBJECTIVE BOX
[facility] DIVISION OF KIDNEY DISEASES AND HYPERTENSION -- FOLLOW UP NOTE  --------------------------------------------------------------------------------  Chief Complaint:  BEREKET    24 hour events/subjective:  SCr significantly improved  Spoke with MICU team;     PAST HISTORY  --------------------------------------------------------------------------------  No significant changes to PMH, PSH, FHx, SHx, unless otherwise noted    ALLERGIES & MEDICATIONS  --------------------------------------------------------------------------------  Allergies    No Known Allergies    Intolerances      Standing Inpatient Medications  acetaminophen  Suppository .. 975 milliGRAM(s) Rectal once  aspirin Suppository 300 milliGRAM(s) Rectal daily  budesonide 160 MICROgram(s)/formoterol 4.5 MICROgram(s) Inhaler 2 Puff(s) Inhalation two times a day  chlorhexidine 4% Liquid 1 Application(s) Topical <User Schedule>  dexMEDEtomidine Infusion 0.3 MICROgram(s)/kG/Hr IV Continuous <Continuous>  enoxaparin Study Injectable () 1 Dose(s) SubCutaneous two times a day  famotidine Injectable 20 milliGRAM(s) IV Push daily  glucagon  Injectable 1 milliGRAM(s) IntraMuscular once  insulin glargine Injectable (LANTUS) 15 Unit(s) SubCutaneous two times a day  insulin lispro (ADMELOG) corrective regimen sliding scale   SubCutaneous three times a day before meals  piperacillin/tazobactam IVPB.. 3.375 Gram(s) IV Intermittent every 12 hours  sodium chloride 0.45%. 1000 milliLiter(s) IV Continuous <Continuous>  vancomycin  IVPB 1250 milliGRAM(s) IV Intermittent <User Schedule>    PRN Inpatient Medications  acetaminophen  Suppository .. 650 milliGRAM(s) Rectal every 6 hours PRN  ALBUTerol    90 MICROgram(s) HFA Inhaler 2 Puff(s) Inhalation every 4 hours PRN  benzonatate 100 milliGRAM(s) Oral three times a day PRN  guaifenesin/dextromethorphan  Syrup 10 milliLiter(s) Oral every 4 hours PRN      REVIEW OF SYSTEMS  --------------------------------------------------------------------------------  per micu Due to the nature of the pt's isolation status, no bedside physical exam done to limit spread of infection. Objective data was reviewed in detail.  Gen: No weight changes, fatigue, fevers/chills, weakness  Skin: No rashes  Head/Eyes/Ears/Mouth: No headache; Normal hearing; Normal vision w/o blurriness; No sinus pain/discomfort, sore throat  Respiratory: No dyspnea, cough, wheezing, hemoptysis  CV: No chest pain, PND, orthopnea  GI: No abdominal pain, diarrhea, constipation, nausea, vomiting, melena, hematochezia  : No increased frequency, dysuria, hematuria, nocturia  MSK: No joint pain/swelling; no back pain; no edema  Neuro: No dizziness/lightheadedness, weakness, seizures, numbness, tingling  Heme: No easy bruising or bleeding  Endo: No heat/cold intolerance  Psych: No significant nervousness, anxiety, stress, depression    All other systems were reviewed and are negative, except as noted.    VITALS/PHYSICAL EXAM  --------------------------------------------------------------------------------  T(C): 36.3 (12-09-20 @ 08:04), Max: 37.1 (12-08-20 @ 16:00)  HR: 96 (12-09-20 @ 11:00) (76 - 118)  BP: 138/85 (12-09-20 @ 11:00) (98/51 - 142/73)  RR: 30 (12-09-20 @ 11:00) (21 - 39)  SpO2: 94% (12-09-20 @ 11:00) (89% - 96%)  Wt(kg): --        12-08-20 @ 07:01  -  12-09-20 @ 07:00  --------------------------------------------------------  IN: 3344.1 mL / OUT: 1550 mL / NET: 1794.1 mL      Physical Exam:   Due to the nature of the pt's isolation status, no bedside physical exam done to limit spread of infection. Objective data was reviewed in detail.  	    LABS/STUDIES  --------------------------------------------------------------------------------              14.9   24.26 >-----------<  352      [12-09-20 @ 04:56]              45.5     154  |  121  |  91.0  ----------------------------<  178      [12-09-20 @ 04:56]  4.3   |  23.0  |  1.83        Ca     8.3     [12-09-20 @ 04:56]      Mg     2.9     [12-09-20 @ 04:56]      Phos  3.9     [12-09-20 @ 04:56]    TPro  6.5  /  Alb  2.7  /  TBili  0.7  /  DBili  x   /  AST  35  /  ALT  142  /  AlkPhos  141  [12-09-20 @ 04:56]        Troponin 0.01      [12-07-20 @ 18:51]    Creatinine Trend:  SCr 1.83 [12-09 @ 04:56]  SCr 3.38 [12-08 @ 04:47]  SCr 2.27 [12-07 @ 05:52]  SCr 1.99 [12-06 @ 03:38]  SCr 1.65 [12-05 @ 04:39]    Urinalysis - [12-06-20 @ 23:42]      Color Yellow / Appearance Slightly Turbid / SG 1.015 / pH 5.0      Gluc Negative / Ketone Negative  / Bili Negative / Urobili 4       Blood Moderate / Protein 100 / Leuk Est Trace / Nitrite Negative      RBC 3-5 / WBC 3-5 / Hyaline  / Gran  / Sq Epi  / Non Sq Epi Few / Bacteria Few    Urine Sodium 80      [12-04-20 @ 00:40]  Urine Osmolality 623      [12-04-20 @ 00:38]    Ferritin 1400      [12-05-20 @ 04:39]    HCV 0.06, Nonreact      [12-03-20 @ 11:51]

## 2020-12-09 NOTE — PROGRESS NOTE ADULT - ASSESSMENT
Derrick vs derrick on ckd  Acute Hypoxic respiratory failure & Multi Focal Pneumonia  COVID-19 infection  Transaminitis     Unknown  baseline kidney function  Renal US with normal appearing kidneys, ? bladder mass    Significant improvement in SCr; vieira;  s/p IV hydration      dw MICU team

## 2020-12-09 NOTE — PROGRESS NOTE ADULT - SUBJECTIVE AND OBJECTIVE BOX
Patient is a 75y old  Male who presents with a chief complaint of Acute hypoxic resp distress secondary to COVID, sepsis (09 Dec 2020 11:46)          Events last 24 hours: *  tolerating high flow more alert and    incontinent of urine-  precedex      HD 7    PAST MEDICAL & SURGICAL HISTORY:  Heart attack    Hyperlipidemia    Hypertension    CAD (coronary artery disease)  stent 2010    S/P cardiac cath  with MI at 59 and s/p 1 stent    S/P laminectomy with spinal fusion  2014          Medications:  piperacillin/tazobactam IVPB.. 3.375 Gram(s) IV Intermittent every 12 hours  vancomycin  IVPB 1250 milliGRAM(s) IV Intermittent <User Schedule>      ALBUTerol    90 MICROgram(s) HFA Inhaler 2 Puff(s) Inhalation every 4 hours PRN  benzonatate 100 milliGRAM(s) Oral three times a day PRN  budesonide 160 MICROgram(s)/formoterol 4.5 MICROgram(s) Inhaler 2 Puff(s) Inhalation two times a day  guaifenesin/dextromethorphan  Syrup 10 milliLiter(s) Oral every 4 hours PRN    acetaminophen  Suppository .. 975 milliGRAM(s) Rectal once  acetaminophen  Suppository .. 650 milliGRAM(s) Rectal every 6 hours PRN  aspirin Suppository 300 milliGRAM(s) Rectal daily  dexMEDEtomidine Infusion 0.3 MICROgram(s)/kG/Hr IV Continuous <Continuous>        famotidine Injectable 20 milliGRAM(s) IV Push daily      glucagon  Injectable 1 milliGRAM(s) IntraMuscular once  insulin glargine Injectable (LANTUS) 15 Unit(s) SubCutaneous two times a day  insulin lispro (ADMELOG) corrective regimen sliding scale   SubCutaneous three times a day before meals    sodium chloride 0.45%. 1000 milliLiter(s) IV Continuous <Continuous>      chlorhexidine 4% Liquid 1 Application(s) Topical <User Schedule>    enoxaparin Study Injectable () 1 Dose(s) SubCutaneous two times a day          ICU Vital Signs Last 24 Hrs  T(C): 36.4 (09 Dec 2020 12:17), Max: 37.1 (08 Dec 2020 16:00)  T(F): 97.6 (09 Dec 2020 12:17), Max: 98.7 (08 Dec 2020 16:00)  HR: 96 (09 Dec 2020 11:00) (76 - 118)  BP: 138/85 (09 Dec 2020 11:00) (98/51 - 142/73)  BP(mean): 76 (09 Dec 2020 10:00) (61 - 99)  ABP: --  ABP(mean): --  RR: 30 (09 Dec 2020 11:00) (24 - 39)  SpO2: 94% (09 Dec 2020 11:00) (89% - 96%)      ABG - ( 08 Dec 2020 05:01 )  pH, Arterial: 7.41  pH, Blood: x     /  pCO2: 34    /  pO2: 99    / HCO3: 23    / Base Excess: -1.9  /  SaO2: 97                  I&O's Detail    08 Dec 2020 07:01  -  09 Dec 2020 07:00  --------------------------------------------------------  IN:    Dexmedetomidine: 119.1 mL    IV PiggyBack: 225 mL    sodium chloride 0.45%: 3000 mL  Total IN: 3344.1 mL    OUT:    Incontinent per Retracted Penis Pouch (mL): 1550 mL  Total OUT: 1550 mL    Total NET: 1794.1 mL            LABS:                        14.9   24.26 )-----------( 352      ( 09 Dec 2020 04:56 )             45.5     12-09    154<H>  |  121<H>  |  91.0<H>  ----------------------------<  178<H>  4.3   |  23.0  |  1.83<H>    Ca    8.3<L>      09 Dec 2020 04:56  Phos  3.9     12-09  Mg     2.9     12-09    TPro  6.5<L>  /  Alb  2.7<L>  /  TBili  0.7  /  DBili  x   /  AST  35  /  ALT  142<H>  /  AlkPhos  141<H>  12-09      CARDIAC MARKERS ( 07 Dec 2020 18:51 )  x     / 0.01 ng/mL / x     / x     / x          CAPILLARY BLOOD GLUCOSE      POCT Blood Glucose.: 184 mg/dL (09 Dec 2020 12:25)        CULTURES:  Culture Results:   No growth at 48 hours (12-06 @ 21:53)  Culture Results:   No growth at 48 hours (12-06 @ 21:52)  Culture Results:   No growth at 5 days. (12-03 @ 06:39)  Culture Results:   No growth at 5 days. (12-03 @ 06:39)  Rapid RVP Result: NotDetec (12-02 @ 21:21)      Physical Examination:    General:   frail male no distress  in bed on high flow good eye contact    HEENT: Pupils equal, reactive to light.  Symmetric.    PULM:   b/l air entry no wheezing rales  rhonchi   NECK:    CVS:  s1 s2 rrr no m   ABD: Soft, nondistended, nontender, normoactive bowel sounds, no masses    EXT:   mild edema    SKIN: Warm and well perfused, no rashes noted.    NEURO: Alert,   weak  upper and lower extremities  follows commands        1- respiratory failure  acute  2-   SARS-cov 2  3-    BEREKET  on CKD /  h/o u/s 12/3/2020    no hydro,   hyperechoic  lesion ? enlarged prostate  vs mass   4- + anti-cardiolipin ab    - neuro  on precedex  resp     high flow   advair titrate down oxygen     gi place  og  low dose feeds as tolerated , pepcid    ID   zosyn   no remdesivir due to creatinine,   dexamethasone  20 mg  x 5 day , lovenox STUDY    renal : bladder scan  - will need urology input     misc  ,    spoke with  daughter  CHRISTEN  questions answered to apparent satisfaction       CRITICAL CARE TIME SPENT: >35minutesCCMincludingcoordinationofcarereviewlabsimagingandfamilydiscussions

## 2020-12-10 NOTE — DISCHARGE NOTE FOR THE EXPIRED PATIENT - OTHER SIGNIFICANT FINDINGS
patient   examined   --    no corneals   no  gag    no breath sounds no  hr  no family currently in room whiled examined     agree with above

## 2020-12-10 NOTE — DISCHARGE NOTE FOR THE EXPIRED PATIENT - REASON FOR ADMISSION
Acute hypoxic resp distress secondary to COVID, sepsis Acute hypoxic respiratory failure 2/2 COVID-19 PNA

## 2020-12-10 NOTE — PROGRESS NOTE ADULT - ASSESSMENT
Derrick vs derrick on ckd  Acute Hypoxic respiratory failure & Multi Focal Pneumonia  COVID-19 infection  Transaminitis     Unknown  baseline kidney function  Renal US with normal appearing kidneys, ? bladder mass    Significant improvement in SCr  trend scr, lytes, uop

## 2020-12-10 NOTE — CHART NOTE - NSCHARTNOTEFT_GEN_A_CORE
74 y/o M w/ a PMHx of HTN, type 2 DM, and CAD s/p PCI x1 who was admitted on 12/2 w/ acute hypoxic respiratory failure/ARDS 2/2 COVID-19 PNA. Upgraded to MICU on 12/4 in the setting of worsening respiratory failure requiring BiPAP. Had since been weaned to HFNC/NRB.    CODE BLUE called. Upon my arrival to the bedside, CPR had been initiated. Per bedside nurse, pt became hypoxic (SpO2 50s) while on HFNC/NRB. Respiratory was also present at bedside, unable to improve O2 sat.     CRITICAL CARE TIME SPENT: 40 minutes 74 y/o M w/ a PMHx of HTN, type 2 DM, and CAD s/p PCI x1 who was admitted on  w/ acute hypoxic respiratory failure/ARDS  COVID-19 PNA. Upgraded to MICU on  in the setting of worsening respiratory failure requiring BiPAP. Had since been weaned to HFNC/NRB.    CODE BLUE called. Upon my arrival to the bedside, CPR had been initiated. Per bedside nurse, pt became hypoxic (SpO2 50s) while on HFNC/NRB. Respiratory was also present at bedside, unable to improve O2 sat. Became bradycardic --> PEA. Of note, pt was slightly hyperkalemic on AM labs (K 5.7). During the code, pt received epi x6, bicarb x3, calcium x1. Despite our maximum efforts, pt .    I alerted pt's daughter, Ethel, on pt's passing (TOD 13:07). Per nursing supervision, will allow for pt's wife and daughter to be present     CRITICAL CARE TIME SPENT: 40 minutes

## 2020-12-10 NOTE — DISCHARGE NOTE FOR THE EXPIRED PATIENT - HOSPITAL COURSE
76 y/o M w/ a PMHx of HTN, type 2 DM, and CAD s/p PCI x1 who was admitted on  w/ acute hypoxic respiratory failure/ARDS  COVID-19 PNA. Upgraded to MICU on  in the setting of worsening respiratory failure requiring BiPAP. Had since been weaned to HFNC/NRB. Course further complicated by acute renal failure.    CODE BLUE called. Upon my arrival to the bedside, CPR had been initiated. Per bedside nurse, pt became hypoxic (SpO2 50s) while on HFNC/NRB. Respiratory was also present at bedside, unable to improve O2 sat. Became bradycardic --> PEA. Of note, pt was slightly hyperkalemic on AM labs (K 5.7). During the code, pt received epi x6, bicarb x3, calcium x1. Despite our maximum efforts, pt .    I alerted pt's daughter, Ethel, on pt's passing (TOD 13:07).

## 2020-12-10 NOTE — CHART NOTE - NSCHARTNOTEFT_GEN_A_CORE
called patient family during code blue spoke to patient family currently full code and wants everything done

## 2020-12-10 NOTE — PROGRESS NOTE ADULT - REASON FOR ADMISSION
Acute hypoxic respiratory failure 2/2 COVID, sepsis
Acute hypoxic resp distress secondary to COVID, sepsis
Acute hypoxic respiratory failure 2/2 COVID-19, sepsis
Acute hypoxic resp distress secondary to COVID, sepsis

## 2020-12-10 NOTE — PROGRESS NOTE ADULT - SUBJECTIVE AND OBJECTIVE BOX
[facility] DIVISION OF KIDNEY DISEASES AND HYPERTENSION --follow up note  --------------------------------------------------------------------------------  24 hour event/ subjective  on high flow/ NRB    PAST HISTORY  --------------------------------------------------------------------------------  PAST MEDICAL & SURGICAL HISTORY:  Heart attack    Hyperlipidemia    Hypertension    CAD (coronary artery disease)  stent 2010    S/P cardiac cath  with MI at 59 and s/p 1 stent    S/P laminectomy with spinal fusion  2014      FAMILY HISTORY:  FH: colon cancer  in mother and father      PAST SOCIAL HISTORY:    ALLERGIES & MEDICATIONS  --------------------------------------------------------------------------------  Allergies    No Known Allergies    Intolerances      Standing Inpatient Medications  acetaminophen  Suppository .. 975 milliGRAM(s) Rectal once  aspirin Suppository 300 milliGRAM(s) Rectal daily  budesonide 160 MICROgram(s)/formoterol 4.5 MICROgram(s) Inhaler 2 Puff(s) Inhalation two times a day  chlorhexidine 4% Liquid 1 Application(s) Topical <User Schedule>  dexMEDEtomidine Infusion 0.3 MICROgram(s)/kG/Hr IV Continuous <Continuous>  enoxaparin Study Injectable () 1 Dose(s) SubCutaneous two times a day  famotidine Injectable 20 milliGRAM(s) IV Push daily  glucagon  Injectable 1 milliGRAM(s) IntraMuscular once  insulin glargine Injectable (LANTUS) 15 Unit(s) SubCutaneous two times a day  insulin lispro (ADMELOG) corrective regimen sliding scale   SubCutaneous three times a day before meals  piperacillin/tazobactam IVPB.. 3.375 Gram(s) IV Intermittent every 8 hours  sodium chloride 0.45%. 1000 milliLiter(s) IV Continuous <Continuous>  vancomycin  IVPB 1250 milliGRAM(s) IV Intermittent <User Schedule>    PRN Inpatient Medications  acetaminophen  Suppository .. 650 milliGRAM(s) Rectal every 6 hours PRN  ALBUTerol    90 MICROgram(s) HFA Inhaler 2 Puff(s) Inhalation every 4 hours PRN  benzonatate 100 milliGRAM(s) Oral three times a day PRN  guaifenesin/dextromethorphan  Syrup 10 milliLiter(s) Oral every 4 hours PRN      REVIEW OF SYSTEMS  unable to obtain  VITALS/PHYSICAL EXAM  --------------------------------------------------------------------------------  T(C): 36.7 (12-10-20 @ 08:00), Max: 36.7 (12-10-20 @ 08:00)  HR: 0 (12-10-20 @ 13:07) (0 - 130)  BP: 0/0 (12-10-20 @ 13:07) (0/0 - 192/73)  RR: 40 (12-10-20 @ 12:50) (18 - 40)  SpO2: 100% (12-10-20 @ 12:00) (90% - 100%)  Wt(kg): --        12-09-20 @ 07:01  -  12-10-20 @ 07:00  --------------------------------------------------------  IN: 3674.5 mL / OUT: 500 mL / NET: 3174.5 mL    12-10-20 @ 07:01  -  12-10-20 @ 15:19  --------------------------------------------------------  IN: 984.5 mL / OUT: 0 mL / NET: 984.5 mL      Physical Exam:  	Due to the nature of the pt's isolation status, no bedside physical exam done to limit spread of infection. Direct communication ordered with pt's RN t bedside.Objective data was reviewed in detail.    LABS/STUDIES  --------------------------------------------------------------------------------              14.4   29.95 >-----------<  296      [12-10-20 @ 09:04]              46.8     157  |  126  |  61.0  ----------------------------<  191      [12-10-20 @ 09:04]  5.7   |  21.0  |  1.25        Ca     8.4     [12-10-20 @ 09:04]      Mg     2.8     [12-10-20 @ 09:04]      Phos  3.4     [12-10-20 @ 09:04]    TPro  6.2  /  Alb  2.1  /  TBili  0.8  /  DBili  x   /  AST  31  /  ALT  85  /  AlkPhos  112  [12-10-20 @ 09:04]          Creatinine Trend:  SCr 1.25 [12-10 @ 09:04]  SCr 1.83 [12-09 @ 04:56]  SCr 3.38 [12-08 @ 04:47]  SCr 2.27 [12-07 @ 05:52]  SCr 1.99 [12-06 @ 03:38]    Urinalysis - [12-06-20 @ 23:42]      Color Yellow / Appearance Slightly Turbid / SG 1.015 / pH 5.0      Gluc Negative / Ketone Negative  / Bili Negative / Urobili 4       Blood Moderate / Protein 100 / Leuk Est Trace / Nitrite Negative      RBC 3-5 / WBC 3-5 / Hyaline  / Gran  / Sq Epi  / Non Sq Epi Few / Bacteria Few    Urine Sodium 80      [12-04-20 @ 00:40]  Urine Osmolality 623      [12-04-20 @ 00:38]    Ferritin 1400      [12-05-20 @ 04:39]    HCV 0.06, Nonreact      [12-03-20 @ 11:51]

## 2020-12-11 LAB
CULTURE RESULTS: SIGNIFICANT CHANGE UP
CULTURE RESULTS: SIGNIFICANT CHANGE UP
SPECIMEN SOURCE: SIGNIFICANT CHANGE UP
SPECIMEN SOURCE: SIGNIFICANT CHANGE UP

## 2020-12-22 NOTE — ED PROVIDER NOTE - INPATIENT RESIDENT/ACP NOTIFIED DATE
Diagnosis: Nivolumab    Regimen: INV ECOG-ACRIN [id number]): NIVOLUMAB: ANAL CANCER: ARM A      Cycle/Day: cycle 2 day 3    Dr. Penny is supervising clinician today.    ECOG:   ECOG [20 1412]   ECOG Performance Status 0       Review and verified Advanced Directives: No, patient has no interest in completing Advanced Directives at this time    Verified if patient has state DNR bracelet on: No; Full Code    Nursing Assessment:   A focused nursing assessment addressing the toxicity of chemotherapy was performed and the patient reports the following:    Patient confirms that she has received a copy of Chemotherapy Consent and verbalizes understanding of treatment plan: Yes.     Pre-Treatment: Treatment consent signed  Patient has valid pre-authorization  VS completed  Height and weight verified  BSA independently double checked & verified by two practitioners  Premed orders, including hydration, are verified prior to administration  Treatment parameters verified in patient protocol  Chemotherapy doses independently doubled checked & verified by two practitioners, warren Weaver  Chemotherapy infusion pump settings are double checked & verified by two practitioners, warren Weaver  Patient is identified by first & last name, Date of birth that has been verified by two practitioners with the patient chairside, warren Weaver    Treatment: Refer to [facility] and MAR for line assessment and medication administration  Chemotherapy has not ; double checked & verified by two practitioners  Appearance and physical integrity of drugs meets standard of drug monograph; double checked & verified by two practitioners  Rate set on infusion pump is in alignment with ordered rate; double checked & verified by two practitioners  Blood return confirmed before, during and after treatment administered  Infusion pump used for non-vesicant drugs    Post Treatment: Treatment tolerated well; no adverse  reaction    Does the Patient have a central line?  yes:   Device: Port  Central Line Site: Left  Central Line Site Appearance: clear  Is this a port? Yes: Implanted port accessed by lab staff  Site Care: Sterile gauze and tape dressing applied  Comments:   Central line flushed with: 20 ml 0.9 normal saline and 100 un/ml- 500 units heparin  Central line removed: no  Rm Needle: Rm needle de-accessed      Transfusion: Not needed    Integrative Medicine: No    Oral Chemotherapy: No    Education: No new instructions needed    Next appointment scheduled: 1/19/21 lab/md/tx  Patient instructed to call the office with any questions or concerns.    Patient Discharged: patient discharged to home per self, ambulatory    - Treatment today -completed  - Lab ( cbc, cmp, TSH), MD, treatment ( Nivolumab) in 4 weeks-scheduled 1/19/21 lab orders in Epic.       02-Dec-2020 21:54

## 2021-06-03 PROCEDURE — 83615 LACTATE (LD) (LDH) ENZYME: CPT

## 2021-06-03 PROCEDURE — 83605 ASSAY OF LACTIC ACID: CPT

## 2021-06-03 PROCEDURE — 73630 X-RAY EXAM OF FOOT: CPT

## 2021-06-03 PROCEDURE — 80053 COMPREHEN METABOLIC PANEL: CPT

## 2021-06-03 PROCEDURE — 84145 PROCALCITONIN (PCT): CPT

## 2021-06-03 PROCEDURE — 36600 WITHDRAWAL OF ARTERIAL BLOOD: CPT

## 2021-06-03 PROCEDURE — 84132 ASSAY OF SERUM POTASSIUM: CPT

## 2021-06-03 PROCEDURE — 85306 CLOT INHIBIT PROT S FREE: CPT

## 2021-06-03 PROCEDURE — 85025 COMPLETE CBC W/AUTO DIFF WBC: CPT

## 2021-06-03 PROCEDURE — 84295 ASSAY OF SERUM SODIUM: CPT

## 2021-06-03 PROCEDURE — 36430 TRANSFUSION BLD/BLD COMPNT: CPT

## 2021-06-03 PROCEDURE — 84484 ASSAY OF TROPONIN QUANT: CPT

## 2021-06-03 PROCEDURE — 94760 N-INVAS EAR/PLS OXIMETRY 1: CPT

## 2021-06-03 PROCEDURE — 80202 ASSAY OF VANCOMYCIN: CPT

## 2021-06-03 PROCEDURE — 86803 HEPATITIS C AB TEST: CPT

## 2021-06-03 PROCEDURE — 94640 AIRWAY INHALATION TREATMENT: CPT

## 2021-06-03 PROCEDURE — 96374 THER/PROPH/DIAG INJ IV PUSH: CPT

## 2021-06-03 PROCEDURE — 85300 ANTITHROMBIN III ACTIVITY: CPT

## 2021-06-03 PROCEDURE — 82803 BLOOD GASES ANY COMBINATION: CPT

## 2021-06-03 PROCEDURE — 85303 CLOT INHIBIT PROT C ACTIVITY: CPT

## 2021-06-03 PROCEDURE — 86147 CARDIOLIPIN ANTIBODY EA IG: CPT

## 2021-06-03 PROCEDURE — 86850 RBC ANTIBODY SCREEN: CPT

## 2021-06-03 PROCEDURE — 86769 SARS-COV-2 COVID-19 ANTIBODY: CPT

## 2021-06-03 PROCEDURE — 86901 BLOOD TYPING SEROLOGIC RH(D): CPT

## 2021-06-03 PROCEDURE — 80048 BASIC METABOLIC PNL TOTAL CA: CPT

## 2021-06-03 PROCEDURE — 81001 URINALYSIS AUTO W/SCOPE: CPT

## 2021-06-03 PROCEDURE — 99285 EMERGENCY DEPT VISIT HI MDM: CPT | Mod: 25

## 2021-06-03 PROCEDURE — 86146 BETA-2 GLYCOPROTEIN ANTIBODY: CPT

## 2021-06-03 PROCEDURE — 85018 HEMOGLOBIN: CPT

## 2021-06-03 PROCEDURE — 85027 COMPLETE CBC AUTOMATED: CPT

## 2021-06-03 PROCEDURE — 84100 ASSAY OF PHOSPHORUS: CPT

## 2021-06-03 PROCEDURE — 87040 BLOOD CULTURE FOR BACTERIA: CPT

## 2021-06-03 PROCEDURE — 93005 ELECTROCARDIOGRAM TRACING: CPT

## 2021-06-03 PROCEDURE — 71045 X-RAY EXAM CHEST 1 VIEW: CPT

## 2021-06-03 PROCEDURE — 84300 ASSAY OF URINE SODIUM: CPT

## 2021-06-03 PROCEDURE — 85014 HEMATOCRIT: CPT

## 2021-06-03 PROCEDURE — 82947 ASSAY GLUCOSE BLOOD QUANT: CPT

## 2021-06-03 PROCEDURE — 93970 EXTREMITY STUDY: CPT

## 2021-06-03 PROCEDURE — 96372 THER/PROPH/DIAG INJ SC/IM: CPT | Mod: XU

## 2021-06-03 PROCEDURE — 85385 FIBRINOGEN ANTIGEN: CPT

## 2021-06-03 PROCEDURE — 0225U NFCT DS DNA&RNA 21 SARSCOV2: CPT

## 2021-06-03 PROCEDURE — 86900 BLOOD TYPING SEROLOGIC ABO: CPT

## 2021-06-03 PROCEDURE — 83935 ASSAY OF URINE OSMOLALITY: CPT

## 2021-06-03 PROCEDURE — 85730 THROMBOPLASTIN TIME PARTIAL: CPT

## 2021-06-03 PROCEDURE — 82962 GLUCOSE BLOOD TEST: CPT

## 2021-06-03 PROCEDURE — 82728 ASSAY OF FERRITIN: CPT

## 2021-06-03 PROCEDURE — 83735 ASSAY OF MAGNESIUM: CPT

## 2021-06-03 PROCEDURE — 36415 COLL VENOUS BLD VENIPUNCTURE: CPT

## 2021-06-03 PROCEDURE — 83036 HEMOGLOBIN GLYCOSYLATED A1C: CPT

## 2021-06-03 PROCEDURE — 82330 ASSAY OF CALCIUM: CPT

## 2021-06-03 PROCEDURE — 76770 US EXAM ABDO BACK WALL COMP: CPT

## 2021-06-03 PROCEDURE — 94660 CPAP INITIATION&MGMT: CPT

## 2021-06-03 PROCEDURE — 85610 PROTHROMBIN TIME: CPT

## 2021-06-03 PROCEDURE — 82550 ASSAY OF CK (CPK): CPT

## 2021-06-03 PROCEDURE — 85379 FIBRIN DEGRADATION QUANT: CPT

## 2021-06-03 PROCEDURE — 86140 C-REACTIVE PROTEIN: CPT

## 2021-06-03 PROCEDURE — 82553 CREATINE MB FRACTION: CPT

## 2021-06-03 PROCEDURE — 82435 ASSAY OF BLOOD CHLORIDE: CPT

## 2021-06-03 PROCEDURE — 85384 FIBRINOGEN ACTIVITY: CPT

## 2021-06-15 NOTE — H&P ADULT - ASSESSMENT
Pre-Operative Instructions    Patient name: Freya Renee         We have scheduled you today for a GI procedure that will be performed sometime within the next few months. Because we realize that there may be some changes in the maintenance of your health after today, but prior to your procedure, we ask that you note the followin. If you have any additional medications that are added to the current medications you are taking, please notify our office so that we can properly instruct you in how to take this around the time of your procedure. For example,  if you started on any type of blood thinner, any type of anti inflammatory medication, or any type of iron supplement or vitamin, these medications will need to be stopped, depending on what they are, several days BEFORE the procedure. If you are given pain medications or any new type of heart medication or blood pressure pills or are a newly diagnoses diabetic put on blood sugar medication, we may also need to make adjustments regarding these.  2. If you have any type of device implanted (pacemaker, defibrillator, implanted pain device or stimulator, or have any kind of joint replacement) please let us know, as special arrangements may need to be made in order for the procedure to be performed.  3. If you develop new allergies, such as Latex, Demerol or Versed, special arrangements may need to be made.  4. We will also need to be made aware of any changes in your insurance as we want to be sure that you receive the full benefit of your plan.      We understand that situations may arise causing you to cancel and/or reschedule your procedure date. We would appreciate at least two weeks notice. Thank you for your cooperation.    The staff of the [facility] offers these suggestions if you are scheduled for surgery.    1. Plan for unforeseen changes in surgery time. Although we try to maintain a set surgery schedule, there are times  when a surgery case may take longer than expected. This may result in your being in the surgery center longer than originally planned.      2. Arrange for an adult to stay with you at the surgery center. It is very important that you have an adult stay with you at the surgery center during your procedure. The medications you receive can make you sleepy and forgetful. For this reason, the doctor may want to discuss your surgery and post-operative care with an adult friend or family member. Additionally, an adult will be needed to drive you home. An adult must stay with you for the first 24 hours after your surgery. IF YOU DO  NOT HAVE AN ADULT TO DRIVE YOU HOME, YOUR SURGERY WILL BE CANCELLED.     3. Remember to follow pre-operative dietary restrictions. An empty stomach is imperative to prevent nausea or vomiting during and after your procedure.      4. Things to bring with you:  · a list of your current medications (including \"over the counter\" and herbal medications)  · important legal documents such as Power of , Guardianship papers  · any assistive device you are currently using (crutches, walker, hearing aid, etc.)    5. Limit visitors while you are at the surgery center. The time spent at the surgery center is very brief and will be limited to preparing you for surgery and assisting you to recover afterwards. Surgery center rooms are very small, which requires us to limit the number of visitors allowed in at one time.      6. Avoid alcoholic beverages. Because you will be asked to \"fast\" before your procedure, your body will be in a naturally dehydrated state. Alcohol will add to this dehydration making you more prone to problems with blood pressure during and after your procedure You should avoid alcoholic beverages for at least 24 hours prior to your surgery.    7. Wear loose comfortable clothes. Clothes that are easy to put on and take off are best. Sweat pants, shirts with buttons, and slip-on  shoes are wise choices. Avoid tight-fitting clothing such as blue jeans and turtlenecks.    8. Patients on Anticoagulation Medications:   IF YOU HAVE NOT RECEIVED INSTRUCTIONS REGARDING YOUR BLOOD THINNING MEDICATIONS WITHIN 7 DAYS OF YOUR SURGERY, PLEASE CALL THE GI DOCTOR'S OFFICE. FAILURE TO DO SO MAY RESULT IN CANCELLATION OF YOUR SURGERY.     9. Ask questions and insist on answers. Surgery can be a stressful time for anyone. If you have any questions or are unsure about any information given to you, be sure to ask for clarification. A patient can never ask too many questions. If there is something on your mind, let us know. We always want you to feel safe and confident in the care you are receiving.         TO ALL [facility] AMBULATORY SURGERY PATIENTS    You can decide today about the care you will receive in the future. [facility] respects your rights and will support your decisions to the fullest extent permitted by law, including your right to refuse or accept medical or surgical treatment. Please be advised that the [facility] prohibits ambulatory surgery centers from upholding Advanced Directives during surgical procedures. For this reason, any Advanced Directive will be null and void during your stay in the [facility] [employer].     Although not honored in the [facility] [employer], you are still entitled to be informed about your rights surrounding Advanced Directives. Advanced Directives are legal documents that enable you to specify what forms of treatment you want performed or withheld should you become unable to make or communicate these decisions on your own.  Although this is not a common decision made by outpatient surgery patients, we would like to let you know that an information booklet, \"A Personal Decision\" is available upon your request.      How do you know if an Advanced  Directive is right for you?  It is important to realize your rights as an individual, what the nature of consent for treatment implies, what a durable power of  for health care is and what a living will is.      After reviewing the booklet, \"A Personal Decision,\" should you have any further questions, please call us at [phone].      Thank you.     PATIENT’S RIGHTS    I. To be treated with respect, consideration and dignity, free from all forms of abuse, harassment and discrimination.     II. To receive quality care and high professional standards in a safe environment.    II. To be provided with appropriate privacy.    III. To expect that all disclosures and records are treated confidentially and, except when required by law, to be given the opportunity to approve or refuse their release.    IV. To be provided, to the degree known, complete information concerning their diagnosis, treatment and prognosis. When appropriate, the information is provided to a person designated by the patient to be a legally authorized person.    V. To review the records pertaining to his/her medical care and to have the information explained or interpreted as necessary except when restricted law.    VI. To expect that we will communicate with you in a matter that you can understand.     VII. To be given the names of all practitioners and health care personnel participating in his/her care.    VIII. To make decisions about the plan of care prior to and during the course of treatment.  IX. To refuse a recommended treatment or plan of care to the extent permitted by law and to be informed of the medical consequences of this action.     X. To expect that your treatment preferences will be responded to as delineated in your Advanced Directive, within the limits of the [facility] bylaws regarding resuscitation    XI. To be informed as to:  A. Rights and Responsibilities.  B. Services available in the organization.  C. Provisions for  after-hours and emergency care.  D.  The charges for services and available payment options.   E. The right to consent or decline participation in proposed research  studies.  F.  The available resources for resolving disputes, grievances, and conflicts.      XII. To expect emergency procedures to be implemented without unnecessary delay.    XIII. To expect a safe hospital transfer with appropriate medical records when necessary.     XIV. To know that all patients rights extend to the patient’s legal representatives.     XV. Complaints regarding Patients Rights or any issue surrounding care received during your stay can be made by contacting any of the following organizations:  i. Medicare patient: Visit the Office of Medicare Beneficiary Ombudsman at www.medicare.gov on the web.  Or call 1-800-Medicare (1-982.237.4569).     TTY users should call 1-952.583.7892.  ii. Non-Medicare patients can contact the [facility] at [phone]; fax [phone], TTY [phone].  iii. Any patient can also contact the [facility] at [phone] (toll free 8:30 am to 5:00 pm, central time, weekdays).        Patient Responsibilities    It is your responsibility as a [facility] patient:    · To provide all personal and family health information needed to provide you with appropriate care.    · To participate to the best of your ability in making decisions about your medical treatment, and to comply with the agreed upon plan of care.    · To ask questions of your physician or other care providers when you do not understand any information or instructions.    · To maintain appointments as scheduled, or to reschedule in a timely fashion.    · To inform your physician and other care providers if you anticipate problems in following prescribed treatment.    · To recognize the impact of your lifestyle on your personal health.    · To inform your physician or other care provider if  you desire a transfer of care to another physician.    · To be considerate of others receiving and providing care.  To observe relevant surgery center policies and procedures.    · To accept financial responsibility for health care services and to work cooperatively with [facility] to resolve financial obligations     Pt is a 74 yo diabetic male with a pmh/o CAD s/p PCI, HTN, HLD, DMII, admitted with:    #Acute hypoxemic respiratory distress due to interstitial pneumonia secondary to COVID-19  - Admit to monitored bed plus spo  - O2 supplemental and maintain SpO2>92%, currently on NC 6L saturating at 92 %  - Steroids as hypoxic  - albuterol HFA q 4hrs  - tessalon pearls prn  - Self prone if able  - Obtain baseline and inflammatory markers: CPK CRP PROCAL CBC CMP LDH TROP FERRITIN DDIMER COAGS   - Blood cultures, lactic acid, urinalysis  - Tylenol prn fever/pain, avoid NSAIDS  - Continue home meds as appropriate  - DVT px VCD + lovenox (bid if BMI>30)   - GI ppx protonix   - Code status: FULL  - ID consulted    #Diabetic foot wound  - podiatry consult  - no s/s cellulitis  - local wound care applied with wet to dry dressing in ED    #Renal Insuffieciency  - baseline Cr unknown  - hold nephrotoxic meds  - hold lisinopril/HCTZ    #DMI with hyperglycemia  - STAT POC  - hold metformin  - POC qAC/HS  - ISS, modified diet    #Transaminitis  - hold ezetimibe  - hold statin  - no s/s cholelithiasis  - monitor on serum chemistry  - consider imaging if worsens  - likely reactive in nature    #HTN/HLD  - modified diet  - hold ACE/HCTZ  - cont BB and ASA  - hold statins in setting of transaminitis    #BPH  - cont flomax                 Pt is a 74 yo diabetic male with a pmh/o CAD s/p PCI, HTN, HLD, DMII, admitted with:    #Acute hypoxemic respiratory distress due to interstitial pneumonia secondary to COVID-19, complicated by sepsis  - Admit to monitored bed plus spo  - O2 supplemental and maintain SpO2>92%, currently on NC 6L saturating at 92 %  - Steroids as hypoxic  - albuterol HFA q 4hrs  - tessalon pearls prn  - Self prone if able  - Obtain baseline and inflammatory markers: CPK CRP PROCAL CBC CMP LDH TROP FERRITIN DDIMER COAGS   - Blood cultures, lactic acid, urinalysis  - Tylenol prn fever/pain, avoid NSAIDS  - Continue home meds as appropriate  - DVT px VCD + lovenox (bid if BMI>30)   - GI ppx protonix   - Code status: FULL  - ID consulted    #Diabetic foot wound  - podiatry consult  - no s/s cellulitis  - local wound care applied with wet to dry dressing in ED    #Renal Insuffieciency  - baseline Cr unknown  - hold nephrotoxic meds  - hold lisinopril/HCTZ    #DMI with hyperglycemia  - STAT POC  - hold metformin  - POC qAC/HS  - ISS, modified diet    #Transaminitis  - hold ezetimibe  - hold statin  - no s/s cholelithiasis  - monitor on serum chemistry  - consider imaging if worsens  - likely reactive in nature    #HTN/HLD  - modified diet  - hold ACE/HCTZ  - cont BB and ASA  - hold statins in setting of transaminitis    #BPH  - cont flomax

## 2023-03-12 NOTE — PROGRESS NOTE ADULT - ASSESSMENT
76 yo male with a h/o CAD s/p PCI x 1, MI at 60yo, DMII, HLD, HTN admitted with acute hypoxemic respiratory distress 2/2 Covid-19 infection. Patient was test for COVID outpatient at an  prior to admission. In the ED, patient was tachycardic, tachypneic, and satting in mid-80s on RA. Patient was placed on 5L NC which improved his sat to 95%.  The patient was started on dexamethasone in ED. Patient's O2 saturation began to decrease and placed on VM 50% overnight. Patient was hypotensive this AM, which improved after getting a fluid bolus. Patient is currently satting 90-91% on VM 50% in a prone position. Patient was placed on NRB, which improved O2Sat to 100%. Will order hiflo and monitor Sats.       #Acute hypoxemic respiratory distress due to interstitial pneumonia secondary to COVID-19, complicated by sepsis  - c/w O2 supplemental and maintain SpO2>92%, currently on HiFlo FIO2 50% satting 91-94%  - c/w dexamethasone 6mg IVP daily  - albuterol HFA q 4hrs  - tessalon pearls prn  - encourage proning if tolerable   - monitor inflammatory markers q72hrs  -blood cultures pending  - Tylenol prn fever/pain, avoid NSAIDS  - ID consulted    #Diabetic foot wound  - podiatry consult  - no s/s cellulitis  - local wound care applied with wet to dry dressing in ED    #Renal Insuffieciency  - baseline Cr unknown  - hold nephrotoxic meds  - hold lisinopril/HCTZ    #DMI with hyperglycemia  - STAT POC  - hold metformin  - POC qAC/HS  - ISS, modified diet    #Transaminitis  - hold ezetimibe  - hold statin  - no s/s cholelithiasis  - monitor on serum chemistry  - consider imaging if worsens  - likely reactive in nature    #HTN/HLD  - modified diet  - hold ACE/HCTZ  - cont BB and ASA  - hold statins in setting of transaminitis    #BPH  - cont flomax      CODE STATUS: FULL  GI PPX: protonix 40mg daily  DVT PPX: VCD and lovenox 40mg BID    DISPO: Patient still requiring supplemental O2, titrate as tolerated, encourage proning. ID consult pending for possible remdesivir.         76 yo male with a h/o CAD s/p PCI x 1, MI at 58yo, DMII, HLD, HTN admitted with acute hypoxemic respiratory distress 2/2 Covid-19 infection. Patient was test for COVID outpatient at an  prior to admission. In the ED, patient was tachycardic, tachypneic, and satting in mid-80s on RA. Patient was placed on 5L NC which improved his sat to 95%.  The patient was started on dexamethasone in ED. Patient's O2 saturation began to decrease and placed on VM 50% overnight. Patient was hypotensive this AM, which improved after getting a fluid bolus. Patient is currently satting 90-91% on VM 50% in a prone position. Patient was placed on NRB, which improved O2Sat to 100%. Will order hiflo and monitor Sats.       #Acute hypoxemic respiratory distress due to interstitial pneumonia secondary to COVID-19, complicated by sepsis  - c/w O2 supplemental and maintain SpO2>92%, currently on HiFlo FIO2 50% satting 91-94%  - wean off supplemental O2 as tolerated  - c/w dexamethasone 6mg IVP daily  - albuterol HFA q 4hrs  - tessalon pearls prn  - encourage proning if tolerable   - monitor inflammatory markers q72hrs  -blood cultures pending  - Tylenol prn fever/pain, avoid NSAIDS  - ID consulted    #Diabetic foot wound  - podiatry consult  - no s/s cellulitis  - local wound care applied with wet to dry dressing in ED    #Renal Insuffieciency  - baseline Cr unknown  - hold nephrotoxic meds  - hold lisinopril/HCTZ    #DMI with hyperglycemia  - STAT POC  - hold metformin  - POC qAC/HS  - ISS, modified diet    #Transaminitis  - hold ezetimibe  - hold statin  - no s/s cholelithiasis  - monitor on serum chemistry  - consider imaging if worsens  - likely reactive in nature    #HTN/HLD  - modified diet  - hold ACE/HCTZ  - cont BB and ASA  - hold statins in setting of transaminitis    #BPH  - cont flomax      CODE STATUS: FULL  GI PPX: protonix 40mg daily  DVT PPX: VCD and lovenox 40mg BID    DISPO: Patient still requiring supplemental O2, titrate as tolerated, encourage proning. ID consult pending for possible remdesivir.         76 yo male with a h/o CAD s/p PCI x 1, MI at 58yo, DMII, HLD, HTN admitted with acute hypoxemic respiratory distress 2/2 Covid-19 infection. Patient was test for COVID outpatient at an  prior to admission. In the ED, patient was tachycardic, tachypneic, and satting in mid-80s on RA. Patient was placed on 5L NC which improved his sat to 95%.  The patient was started on dexamethasone in ED. Patient's O2 saturation began to decrease and placed on VM 50% overnight. Patient was hypotensive this AM, which improved after getting a fluid bolus. Patient is currently satting 90-91% on VM 50% in a prone position. Patient was placed on NRB, which improved O2Sat to 100%. Will order hiflo and monitor Sats.       #Acute hypoxemic respiratory distress due to interstitial pneumonia secondary to COVID-19, complicated by sepsis  - c/w O2 supplemental and maintain SpO2>92%, currently on HiFlo FIO2 50% satting 91-94%  - wean off supplemental O2 as tolerated  - c/w dexamethasone 6mg IVP daily  - albuterol HFA q 4hrs  - tessalon pearls prn  - encourage proning if tolerable   - monitor inflammatory markers q72hrs  -blood cultures pending  - Tylenol prn fever/pain, avoid NSAIDS  - ID consulted    #Diabetic foot wound  - podiatry consult  - no s/s cellulitis  - local wound care applied with wet to dry dressing in ED    #Renal Insuffieciency  - baseline Cr unknown  - hold nephrotoxic meds  - hold lisinopril/HCTZ    #DM2  -A1c 6.3  -hold metfromin for now  -c/w ISS  - monitor accu-check qAC/HS    #Transaminitis  - likely reactive in the setting of covid  - monitor LFTs   - hold ezetimibe  - hold statin  - no s/s cholelithiasis    #HTN/HLD  - c/w toprol 50mg with holding parameters  - hold statins in setting of transaminitis    #BPH  - c/w flomax 0.4 mg     CODE STATUS: FULL  GI PPX: protonix 40mg daily  DVT PPX: VCD and lovenox 40mg BID    DISPO: Patient still requiring supplemental O2, titrate as tolerated, encourage proning. ID consult pending for possible remdesivir.         74 yo male with a h/o CAD s/p PCI x 1, MI at 60yo, DMII, HLD, HTN admitted with acute hypoxemic respiratory distress 2/2 Covid-19 infection. Patient was test for COVID outpatient at an  prior to admission. In the ED, patient was tachycardic, tachypneic, and satting in mid-80s on RA. Patient was placed on 5L NC which improved his sat to 95%.  The patient was started on dexamethasone in ED. Patient's O2 saturation began to decrease and placed on VM 50% overnight. Patient was hypotensive this AM, which improved after getting a fluid bolus. Patient is currently satting 90-91% on VM 50% in a prone position. Patient was placed on NRB, which improved O2Sat to 100%. Will order hiflo and monitor Sats.       #Acute hypoxemic respiratory distress due to interstitial pneumonia secondary to COVID-19, complicated by sepsis  - c/w O2 supplemental and maintain SpO2>92%, currently on HiFlo FIO2 50% satting 91-94%  - wean off supplemental O2 as tolerated  - c/w dexamethasone 6mg IVP daily  - albuterol HFA q 4hrs  - tessalon pearls prn  - encourage proning if tolerable   - monitor inflammatory markers q72hrs  -blood cultures pending  - Tylenol prn fever/pain, avoid NSAIDS  - ID consulted: due to transaminitis and juan carlos patient unable to receive remdesivir, but will consider convalescent plasma.    #Diabetic foot wound  - podiatry consult  - no s/s cellulitis  - local wound care applied with wet to dry dressing in ED    #Renal Insuffieciency  - baseline Cr unknown  - hold nephrotoxic meds  - hold lisinopril/HCTZ    #DM2  -A1c 6.3  -hold metfromin for now  -c/w ISS  - monitor accu-check qAC/HS    #Transaminitis  - likely reactive in the setting of covid  - monitor LFTs   - hold ezetimibe  - hold statin  - no s/s cholelithiasis    #HTN/HLD  - c/w toprol 50mg with holding parameters  - hold statins in setting of transaminitis    #BPH  - c/w flomax 0.4 mg     CODE STATUS: FULL  GI PPX: protonix 40mg daily  DVT PPX: VCD and lovenox 40mg BID    DISPO: Patient still requiring supplemental O2, titrate as tolerated, encourage proning.         74 yo male with a h/o CAD s/p PCI x 1, MI at 58yo, DMII, HLD, HTN admitted with acute hypoxemic respiratory distress 2/2 Covid-19 infection. Patient was test for COVID outpatient at an  prior to admission. In the ED, patient was tachycardic, tachypneic, and satting in mid-80s on RA. Patient was placed on 5L NC which improved his sat to 95%.  The patient was started on dexamethasone in ED. Patient's O2 saturation began to decrease and placed on VM 50% overnight. Patient was hypotensive this AM, which improved after getting a fluid bolus. Patient is currently satting 90-91% on VM 50% in a prone position. Patient was placed on NRB, which improved O2Sat to 100%. Will order hiflo and monitor Sats.       #Acute hypoxemic respiratory distress due to interstitial pneumonia secondary to COVID-19, complicated by sepsis  - c/w O2 supplemental and maintain SpO2>92%, currently on HiFlo FIO2 50% satting 91-94%  - wean off supplemental O2 as tolerated  - c/w dexamethasone 6mg IVP daily  - albuterol HFA q 4hrs  - tessalon pearls prn  - encourage proning if tolerable   - monitor inflammatory markers q72hrs  -blood cultures pending  - Tylenol prn fever/pain, avoid NSAIDS  - ID consulted: due to transaminitis and juan carlos patient unable to receive remdesivir, but will consider convalescent plasma.    #Diabetic foot wound  - podiatry consult  - no s/s cellulitis  - local wound care applied with wet to dry dressing in ED    #Renal Insuffieciency  - baseline Cr unknown, wife reports no prior CKD  - hold nephrotoxic meds  - hold lisinopril/HCTZ    #DM2  -A1c 6.3  -hold metfromin for now  -c/w ISS  - monitor accu-check qAC/HS    #Transaminitis  - likely reactive in the setting of covid  - monitor LFTs   - hold ezetimibe  - hold statin  - no s/s cholelithiasis    #HTN/HLD  - c/w toprol 50mg with holding parameters  - hold statins in setting of transaminitis    #BPH  - c/w flomax 0.4 mg     CODE STATUS: FULL  GI PPX: protonix 40mg daily  DVT PPX: VCD and lovenox 40mg BID    DISPO: Patient still requiring supplemental O2, titrate as tolerated, encourage proning.         To go home

## 2024-02-22 NOTE — PROGRESS NOTE ADULT - SUBJECTIVE AND OBJECTIVE BOX
Detail Level: Generalized PUL / CCM  Patient is a 75y old  Male who presents with a chief complaint of Acute hypoxic resp distress secondary to COVID, sepsis (07 Dec 2020 14:15)      BRIEF HOSPITAL COURSE: **Patient is a 75 year old male with a pmhx of DM, HTN, and CAD s/p PCI x 1 who was admitted on  w/ acute hypoxic respiratory failure 2/ COVID-19 PNA. Upgraded to MICU level of care on  in the setting of worsening hypoxia and increased work of breathing. Transitioned to BiPAP and has been bipap dependent since that time. Course complicated by BEREKET, transaminitis, lactic acidosis and now febrile  *    Events last 24 hours: *  - Mr Nagy was febrile     could not place vieira some anxiety on  bipap 75% /  TV 500cc r 25  sats > 90 %  **    PAST MEDICAL & SURGICAL HISTORY:  Heart attack    Hyperlipidemia    Hypertension    CAD (coronary artery disease)  stent     S/P cardiac cath  with MI at 59 and s/p 1 stent    S/P laminectomy with spinal fusion            Medications:  piperacillin/tazobactam IVPB.. 3.375 Gram(s) IV Intermittent every 12 hours    metoprolol succinate ER 50 milliGRAM(s) Oral daily  tamsulosin 0.4 milliGRAM(s) Oral at bedtime    ALBUTerol    90 MICROgram(s) HFA Inhaler 2 Puff(s) Inhalation every 4 hours PRN  benzonatate 100 milliGRAM(s) Oral three times a day PRN  guaifenesin/dextromethorphan  Syrup 10 milliLiter(s) Oral every 4 hours PRN    acetaminophen  Suppository .. 650 milliGRAM(s) Rectal every 6 hours PRN  aspirin Suppository 300 milliGRAM(s) Rectal daily  dexMEDEtomidine Infusion 0.3 MICROgram(s)/kG/Hr IV Continuous <Continuous>  LORazepam   Injectable 0.5 milliGRAM(s) IV Push once PRN            dexAMETHasone  IVPB 20 milliGRAM(s) IV Intermittent daily  glucagon  Injectable 1 milliGRAM(s) IntraMuscular once  insulin glargine Injectable (LANTUS) 15 Unit(s) SubCutaneous two times a day  insulin lispro (ADMELOG) corrective regimen sliding scale   SubCutaneous three times a day before meals    dextrose 5%. 1000 milliLiter(s) IV Continuous <Continuous>      chlorhexidine 4% Liquid 1 Application(s) Topical <User Schedule>    enoxaparin Study Injectable () 1 Dose(s) SubCutaneous two times a day          ICU Vital Signs Last 24 Hrs  T(C): 38.1 (07 Dec 2020 13:00), Max: 38.5 (06 Dec 2020 20:00)  T(F): 100.5 (07 Dec 2020 13:00), Max: 101.3 (06 Dec 2020 20:00)  HR: 107 (07 Dec 2020 13:) (98 - 120)  BP: 104/64 (07 Dec 2020 13:00) (93/62 - 133/59)  BP(mean): 74 (07 Dec 2020 13:) (66 - 106)  ABP: --  ABP(mean): --  RR: 21 (07 Dec 2020 13:) (16 - 33)  SpO2: 94% (07 Dec 2020 13:) (91% - 96%)      ABG - ( 07 Dec 2020 12:06 )  pH, Arterial: 7.46  pH, Blood: x     /  pCO2: 39    /  pO2: 87    / HCO3: 27    / Base Excess: 3.2   /  SaO2: 96                  I&O's Detail    06 Dec 2020 07:01  -  07 Dec 2020 07:00  --------------------------------------------------------  IN:    Dexmedetomidine: 98.4 mL    dextrose 5%: 100 mL    IV PiggyBack: 175 mL    IV PiggyBack: 50 mL  Total IN: 423.4 mL    OUT:    Voided (mL): 950 mL  Total OUT: 950 mL    Total NET: -526.6 mL      07 Dec 2020 07:01  -  07 Dec 2020 14:47  --------------------------------------------------------  IN:    dextrose 5%: 250 mL    IV PiggyBack: 500 mL  Total IN: 750 mL    OUT:    Voided (mL): 400 mL  Total OUT: 400 mL    Total NET: 350 mL            LABS:                        14.8   21.13 )-----------( 450      ( 07 Dec 2020 05:52 )             45.1         156<H>  |  114<H>  |  100.0<H>  ----------------------------<  227<H>  4.6   |  25.0  |  2.27<H>    Ca    9.1      07 Dec 2020 05:52  Phos  6.0       Mg     3.6         TPro  7.6  /  Alb  3.0<L>  /  TBili  1.2  /  DBili  x   /  AST  278<H>  /  ALT  288<H>  /  AlkPhos  208<H>            CAPILLARY BLOOD GLUCOSE      POCT Blood Glucose.: 292 mg/dL (07 Dec 2020 11:33)    PT/INR - ( 07 Dec 2020 05:53 )   PT: 20.1 sec;   INR: 1.78 ratio         PTT - ( 07 Dec 2020 05:53 )  PTT:55.5 sec  Urinalysis Basic - ( 06 Dec 2020 23:42 )    Color: Yellow / Appearance: Slightly Turbid / S.015 / pH: x  Gluc: x / Ketone: Negative  / Bili: Negative / Urobili: 4 mg/dL   Blood: x / Protein: 100 mg/dL / Nitrite: Negative   Leuk Esterase: Trace / RBC: 3-5 /HPF / WBC 3-5   Sq Epi: x / Non Sq Epi: Few / Bacteria: Few      CULTURES:  Culture Results:   No growth at 48 hours ( @ 06:39)  Culture Results:   No growth at 48 hours ( @ 06:39)  Rapid RVP Result: NotDetec ( @ 21:21)      Physical Examination:    General:   frail male in bed on bipap obeys commands   comfortable    HEENT: Pupils equal, reactive to light.   anicteric    PULM:   b/l air entry  no wheezing rales  rhonchi     NECK: Supple, no lymphadenopathy, trachea midline    CVS: Regular rate and rhythm, no murmurs, rubs, or gallops    ABD: Soft, nondistended, nontender, normoactive bowel sounds, no masses    EXT:   mild edema    toe dressing    SKIN: Warm and well perfused, no rashes noted.    NEURO:   opens eyes  obeys commands    DEVICES:     RADIOLOGY: ***    a/p  76y/o   1- respiratory failure   SARS-Cov2  /    2- ARF  3- fevers  4- cannot place vieira / h/o BPH  5- cad/  stent   6- laminectomy with spinal fusion   7- htn   8- DM  9- hypernatremia     neurologic   precedex  resp  follow up abg on bipap   75 %  titrate down oxygen  ID/pulm     add vanco to zosyn   follow up cxr  no redesivir  due to high creatinine  gu     bladder scan   urology for vieira   [facility]     change to suppository   aspirin   fen/ renal      follo w up creatinine      d5w  follow up sodium   heme  lovenox study       >time  ccm 35   including   co-ordination of care  review cxr labs Detail Level: Detailed Quality 137: Melanoma: Continuity Of Care - Recall System: Patient information entered into a recall system that includes: target date for the next exam specified AND a process to follow up with patients regarding missed or unscheduled appointments When Should The Patient Follow-Up For Their Next Full-Body Skin Exam?: 3 Months

## 2024-02-23 NOTE — INPATIENT CERTIFICATION FOR MEDICARE PATIENTS - PHYSICIAN CONCUR
43-year-old male presented to the ER with severe upper quadrant abdominal pain in the course of his stay he was evaluated by gastroenterology.  He was made n.p.o. he was started on IV hydration stones were ruled out by ultrasound.  He had significant improvement he was tolerating clear liquid diet.  He did have acute kidney injury which resolved with IV hydration.  His leukocytosis trended down to normal without intervention.  He had nonspecific T wave abnormalities on EKG which was evaluated by cardiology     
I concur with the Admission Order and I certify that services are provided in accordance with Section 42 CFR § 412.3
